# Patient Record
Sex: FEMALE | Race: WHITE | NOT HISPANIC OR LATINO | Employment: FULL TIME
[De-identification: names, ages, dates, MRNs, and addresses within clinical notes are randomized per-mention and may not be internally consistent; named-entity substitution may affect disease eponyms.]

---

## 2017-05-10 ENCOUNTER — RECORDS - HEALTHEAST (OUTPATIENT)
Dept: ADMINISTRATIVE | Facility: OTHER | Age: 42
End: 2017-05-10

## 2017-05-12 ENCOUNTER — HOSPITAL ENCOUNTER (OUTPATIENT)
Dept: ULTRASOUND IMAGING | Facility: HOSPITAL | Age: 42
Discharge: HOME | End: 2017-05-12
Attending: OBSTETRICS & GYNECOLOGY

## 2017-05-12 DIAGNOSIS — E04.1 THYROID NODULE: ICD-10-CM

## 2018-06-05 ENCOUNTER — AMBULATORY - HEALTHEAST (OUTPATIENT)
Dept: SCHEDULING | Facility: CLINIC | Age: 43
End: 2018-06-05

## 2018-06-05 ENCOUNTER — RECORDS - HEALTHEAST (OUTPATIENT)
Dept: ADMINISTRATIVE | Facility: OTHER | Age: 43
End: 2018-06-05

## 2018-06-05 ENCOUNTER — AMBULATORY - HEALTHEAST (OUTPATIENT)
Dept: MULTI SPECIALTY CLINIC | Facility: CLINIC | Age: 43
End: 2018-06-05

## 2018-06-05 DIAGNOSIS — M85.80 LOW BONE MASS: ICD-10-CM

## 2018-06-05 LAB — PAP SMEAR - HIM PATIENT REPORTED: NORMAL

## 2018-06-06 ENCOUNTER — HOSPITAL ENCOUNTER (OUTPATIENT)
Dept: MAMMOGRAPHY | Facility: CLINIC | Age: 43
Discharge: HOME | End: 2018-06-06
Attending: OBSTETRICS & GYNECOLOGY

## 2018-06-06 ENCOUNTER — COMMUNICATION - HEALTHEAST (OUTPATIENT)
Dept: TELEHEALTH | Facility: CLINIC | Age: 43
End: 2018-06-06

## 2018-06-06 DIAGNOSIS — Z12.31 VISIT FOR SCREENING MAMMOGRAM: ICD-10-CM

## 2019-03-14 ENCOUNTER — OFFICE VISIT - HEALTHEAST (OUTPATIENT)
Dept: FAMILY MEDICINE | Facility: CLINIC | Age: 44
End: 2019-03-14

## 2019-03-14 DIAGNOSIS — J01.00 ACUTE MAXILLARY SINUSITIS, RECURRENCE NOT SPECIFIED: ICD-10-CM

## 2019-03-14 DIAGNOSIS — F41.8 DEPRESSION WITH ANXIETY: ICD-10-CM

## 2019-03-14 ASSESSMENT — MIFFLIN-ST. JEOR: SCORE: 1280.24

## 2019-03-20 ENCOUNTER — COMMUNICATION - HEALTHEAST (OUTPATIENT)
Dept: FAMILY MEDICINE | Facility: CLINIC | Age: 44
End: 2019-03-20

## 2019-03-20 DIAGNOSIS — J01.00 ACUTE MAXILLARY SINUSITIS, RECURRENCE NOT SPECIFIED: ICD-10-CM

## 2019-06-20 ENCOUNTER — OFFICE VISIT - HEALTHEAST (OUTPATIENT)
Dept: FAMILY MEDICINE | Facility: CLINIC | Age: 44
End: 2019-06-20

## 2019-06-20 DIAGNOSIS — J01.10 ACUTE NON-RECURRENT FRONTAL SINUSITIS: ICD-10-CM

## 2019-08-13 ENCOUNTER — RECORDS - HEALTHEAST (OUTPATIENT)
Dept: ADMINISTRATIVE | Facility: OTHER | Age: 44
End: 2019-08-13

## 2019-08-21 ENCOUNTER — AMBULATORY - HEALTHEAST (OUTPATIENT)
Dept: SCHEDULING | Facility: CLINIC | Age: 44
End: 2019-08-21

## 2019-08-21 DIAGNOSIS — K50.90 CROHN'S DISEASE (H): ICD-10-CM

## 2019-09-23 ENCOUNTER — OFFICE VISIT - HEALTHEAST (OUTPATIENT)
Dept: FAMILY MEDICINE | Facility: CLINIC | Age: 44
End: 2019-09-23

## 2019-09-23 ENCOUNTER — COMMUNICATION - HEALTHEAST (OUTPATIENT)
Dept: FAMILY MEDICINE | Facility: CLINIC | Age: 44
End: 2019-09-23

## 2019-09-23 DIAGNOSIS — J32.9 CHRONIC SINUSITIS, UNSPECIFIED LOCATION: ICD-10-CM

## 2019-09-23 DIAGNOSIS — D84.9 IMMUNOSUPPRESSION (H): ICD-10-CM

## 2019-09-23 DIAGNOSIS — J06.9 VIRAL URI WITH COUGH: ICD-10-CM

## 2019-11-07 ENCOUNTER — OFFICE VISIT - HEALTHEAST (OUTPATIENT)
Dept: FAMILY MEDICINE | Facility: CLINIC | Age: 44
End: 2019-11-07

## 2019-11-07 DIAGNOSIS — J01.10 ACUTE NON-RECURRENT FRONTAL SINUSITIS: ICD-10-CM

## 2019-11-07 DIAGNOSIS — J32.9 CHRONIC SINUSITIS, UNSPECIFIED LOCATION: ICD-10-CM

## 2019-11-07 RX ORDER — LORAZEPAM 0.5 MG/1
0.5 TABLET ORAL
Qty: 15 TABLET | Refills: 0 | Status: SHIPPED | OUTPATIENT
Start: 2019-11-07

## 2019-11-07 RX ORDER — ALBUTEROL SULFATE 90 UG/1
1-2 AEROSOL, METERED RESPIRATORY (INHALATION) EVERY 4 HOURS PRN
Qty: 8.5 G | Refills: 3 | Status: SHIPPED | OUTPATIENT
Start: 2019-11-07

## 2019-11-07 RX ORDER — CITALOPRAM HYDROBROMIDE 40 MG/1
40 TABLET ORAL DAILY
Qty: 90 TABLET | Refills: 3 | Status: SHIPPED | OUTPATIENT
Start: 2019-11-07

## 2019-11-07 ASSESSMENT — MIFFLIN-ST. JEOR: SCORE: 1272.87

## 2019-12-17 ENCOUNTER — COMMUNICATION - HEALTHEAST (OUTPATIENT)
Dept: FAMILY MEDICINE | Facility: CLINIC | Age: 44
End: 2019-12-17

## 2019-12-17 ENCOUNTER — OFFICE VISIT - HEALTHEAST (OUTPATIENT)
Dept: FAMILY MEDICINE | Facility: CLINIC | Age: 44
End: 2019-12-17

## 2019-12-17 DIAGNOSIS — R05.9 COUGH: ICD-10-CM

## 2019-12-17 DIAGNOSIS — R52 BODY ACHES: ICD-10-CM

## 2019-12-17 LAB
FLUAV AG SPEC QL IA: NORMAL
FLUBV AG SPEC QL IA: NORMAL

## 2021-05-29 NOTE — PROGRESS NOTES
Assessment:   1. Acute non-recurrent frontal sinusitis  Continue Albuterol inhaler to avoid Asthma exacerbation  - albuterol (PROAIR HFA;PROVENTIL HFA;VENTOLIN HFA) 90 mcg/actuation inhaler; Inhale 1-2 puffs every 4 (four) hours as needed for wheezing or shortness of breath.  Dispense: 8.5 g; Refill: 3  - predniSONE (DELTASONE) 20 MG tablet; Take 40 mg by mouth daily.  Dispense: 10 tablet; Refill: 0  - azithromycin (ZITHROMAX) 250 MG tablet; Take 1 tablet (250 mg total) by mouth daily for 5 days. Take 500 mg (2 x 250 mg tablets) on day 1 followed by 250 mg (1 tablet) on days 2-5.  Dispense: 6 tablet; Refill: 0      Plan:   Nasal saline sprays.  Nasal steroids per medication orders.  Zithromax per medication orders.  Follow up in 6 days or as needed.     Subjective:   Selam Tomlinson is a 44 y.o. female who presents for evaluation of sinus pain. Symptoms include: congestion, cough, facial pain, headaches, nasal congestion, post nasal drip and sinus pressure. Onset of symptoms was 4 days ago. Symptoms have been gradually worsening since that time. Past history is significant for asthma and frequent episodes of bronchitis. Patient is a non-smoker.    The following portions of the patient's history were reviewed and updated as appropriate: allergies, current medications, past family history, past medical history, past social history, past surgical history and problem list.    Review of Systems  Pertinent items are noted in HPI.     Objective:   /81   Pulse 77   Temp 98.6  F (37  C)   Wt 135 lb 11.2 oz (61.6 kg)   SpO2 99%   BMI 22.24 kg/m    General appearance: alert, appears stated age and cooperative  Head: Normocephalic, without obvious abnormality, atraumatic, sinuses tender to percussion  Eyes: conjunctivae/corneas clear. PERRL, EOM's intact. Fundi benign.  Ears: normal TM's and external ear canals both ears  Nose: yellow discharge, severe congestion, turbinates swollen, inflamed  Throat: lips, mucosa,  and tongue normal; teeth and gums normal  Neck: no adenopathy, no carotid bruit, no JVD, supple, symmetrical, trachea midline and thyroid not enlarged, symmetric, no tenderness/mass/nodules  Lungs: clear to auscultation bilaterally  Heart: regular rate and rhythm, S1, S2 normal, no murmur, click, rub or gallop  Pulses: 2+ and symmetric  Skin: Skin color, texture, turgor normal. No rashes or lesions  Lymph nodes: Cervical, supraclavicular, and axillary nodes normal.  Neurologic: Grossly normal

## 2021-05-31 ENCOUNTER — RECORDS - HEALTHEAST (OUTPATIENT)
Dept: ADMINISTRATIVE | Facility: CLINIC | Age: 46
End: 2021-05-31

## 2021-06-01 NOTE — TELEPHONE ENCOUNTER
Question following Office Visit  When did you see your provider: Today Walk-in care  What is your question: patient was seen this morning in walk-in care and stated they never looked at her sinuses. Patient feels she has a sinus infection and they never go away on their own. Patient is asking if Lesa Gaviria can call her tomorrow.  Okay to leave a detailed message: Yes [phone]

## 2021-06-02 ENCOUNTER — RECORDS - HEALTHEAST (OUTPATIENT)
Dept: ADMINISTRATIVE | Facility: CLINIC | Age: 46
End: 2021-06-02

## 2021-06-02 VITALS — HEIGHT: 66 IN | WEIGHT: 137 LBS | BODY MASS INDEX: 22.02 KG/M2

## 2021-06-03 VITALS
WEIGHT: 135.38 LBS | SYSTOLIC BLOOD PRESSURE: 110 MMHG | BODY MASS INDEX: 21.76 KG/M2 | HEIGHT: 66 IN | DIASTOLIC BLOOD PRESSURE: 70 MMHG

## 2021-06-03 VITALS
RESPIRATION RATE: 12 BRPM | WEIGHT: 134.2 LBS | BODY MASS INDEX: 21.99 KG/M2 | SYSTOLIC BLOOD PRESSURE: 104 MMHG | HEART RATE: 67 BPM | OXYGEN SATURATION: 98 % | DIASTOLIC BLOOD PRESSURE: 67 MMHG | TEMPERATURE: 98.4 F

## 2021-06-03 VITALS — BODY MASS INDEX: 22.24 KG/M2 | WEIGHT: 135.7 LBS

## 2021-06-03 NOTE — PROGRESS NOTES
HPI:  Selam Tomlinson is a 44 y.o. female who is seen for refills of her citalopram.  She would like to increase her dose to 40 mg daily.  She notes some difficulty sleeping, fatigue, increased anxiety.  She denies suicidal or homicidal ideations.  She has had increased stress due to work.  She continues to receive treatment for Crohn's disease, gets Remicade injections once a month.  She got her last Remicade approximately 2 weeks ago.  She was seen recently in urgent care for congestion, she gets about 4 sinus infections a year.  She is now had about a month of the symptoms and still having sinus pressure and pain.    Review of systems: Negative for fever, diarrhea, bloody stools, vomiting.  Positive for malaise, nausea, headache, lower lid edema bilaterally.  As in HPI.    Chief Complaint   Patient presents with     Medication Check   Selam Tomlinson having worsening sinusitis.    No results found for: HGBA1C  Lab Results   Component Value Date    CREATININE 0.72 02/05/2016     Patient Active Problem List   Diagnosis     Pelvic abscess in female     CD (Crohn's disease) (H)     Anxiety and depression     Cannot sleep     Compulsive tobacco user syndrome     Lower abdominal pain     Abscess, intra-abdominal, postoperative, initial encounter     Intra-abdominal abscess (H)     Acne vulgaris     Allergy to cats     Allergy to dogs     Atypical nevi     Mild intermittent asthma     Tobacco use disorder     Depression with anxiety     Insomnia, unspecified     Crohn's disease (H)     Family History   Problem Relation Age of Onset     Crohn's disease Mother      Crohn's disease Father      Diabetes type II Maternal Grandmother      Crohn's disease Sister      Social History     Socioeconomic History     Marital status:      Spouse name: None     Number of children: None     Years of education: None     Highest education level: None   Occupational History     None   Social Needs     Financial resource strain:  None     Food insecurity:     Worry: None     Inability: None     Transportation needs:     Medical: None     Non-medical: None   Tobacco Use     Smoking status: Former Smoker     Types: Cigarettes     Smokeless tobacco: Never Used   Substance and Sexual Activity     Alcohol use: No     Comment: social     Drug use: No     Sexual activity: None   Lifestyle     Physical activity:     Days per week: None     Minutes per session: None     Stress: None   Relationships     Social connections:     Talks on phone: None     Gets together: None     Attends [employer] service: None     Active member of club or organization: None     Attends meetings of clubs or organizations: None     Relationship status: None     Intimate partner violence:     Fear of current or ex partner: None     Emotionally abused: None     Physically abused: None     Forced sexual activity: None   Other Topics Concern     None   Social History Narrative     None     Past Surgical History:   Procedure Laterality Date     NO PAST SURGERIES       MD PART REMOVAL COLON W ANASTOMOSIS N/A 2016    Procedure: EXPLORATORY LAPAROTOMY, ILEO CECAL RESECTION, ILEO COLOSTOMY;  Surgeon: Alexandre Ledesma MD;  Location: [facility];  Service: General     Current Outpatient Medications on File Prior to Visit   Medication Sig Dispense Refill     INFLIXIMAB (REMICADE IV) Infuse into a venous catheter.       levonorgestrel (MIRENA) 20 mcg/24 hr (5 years) IUD 1 each by Intrauterine route.       No current facility-administered medications on file prior to visit.      No Known Allergies  OB History        2    Para   2    Term   2            AB        Living           SAB        TAB        Ectopic        Multiple        Live Births                   I have reviewed the patient's medical history in detail and updated the computerized patient record.  OBJECTIVE:  Wt Readings from Last 3 Encounters:   19 135 lb 6 oz (61.4 kg)   19 134 lb 3.2  oz (60.9 kg)   06/20/19 135 lb 11.2 oz (61.6 kg)     Temp Readings from Last 3 Encounters:   09/23/19 98.4  F (36.9  C) (Oral)   06/20/19 98.6  F (37  C)   02/06/16 98.6  F (37  C) (Oral)     BP Readings from Last 3 Encounters:   11/07/19 110/70   09/23/19 104/67   06/20/19 127/81     Pulse Readings from Last 3 Encounters:   09/23/19 67   06/20/19 77   03/14/19 81     Body mass index is 22.18 kg/m .     Alert, cooperative, well-hydrated. Appears well.  Eyes: Pupils equal, round, reactive to light.  HEENT: Sclera white, nares injected and erythematous, edematous bilaterally, MMM, TM's pearly bilaterally  Lungs: Clear to auscultation. No retractions, no increased work of respiration, equal chest rise.   Heart: Regular rate and rhythm, no murmurs, clicks,   Gallops.  Extremities: no tenderness to palpation of gastrocnemius, bilaterally.  Skin: no increased warmth, edema, or erythema of lower legs bilaterally.    Labs:  No visits with results within 3 Month(s) from this visit.   Latest known visit with results is:   Admission on 02/02/2016, Discharged on 02/06/2016   Component Date Value     WBC 02/02/2016 13.1*     RBC 02/02/2016 3.76*     Hemoglobin 02/02/2016 10.4*     Hematocrit 02/02/2016 32.9*     MCV 02/02/2016 87      MCH 02/02/2016 27.7      MCHC 02/02/2016 31.7*     RDW 02/02/2016 16.6*     Platelets 02/02/2016 433      MPV 02/02/2016 7.4      Sodium 02/02/2016 138      Potassium 02/02/2016 3.9      Chloride 02/02/2016 104      CO2 02/02/2016 26      Anion Gap, Calculation 02/02/2016 8      Glucose 02/02/2016 96      Calcium 02/02/2016 9.0      BUN 02/02/2016 3*     Creatinine 02/02/2016 0.66      GFR MDRD Af Amer 02/02/2016 >60      GFR MDRD Non Af Amer 02/02/2016 >60      Color, UA 02/02/2016 Yellow      Clarity, UA 02/02/2016 Cloudy*     Glucose, UA 02/02/2016 Negative      Bilirubin, UA 02/02/2016 Negative      Ketones, UA 02/02/2016 Trace*     Specific Gravity, UA 02/02/2016 1.021      Blood, UA  02/02/2016 Trace*     pH, UA 02/02/2016 5.5      Protein, UA 02/02/2016 30 mg/dL*     Urobilinogen, UA 02/02/2016 2.0 E.U./dL      Nitrite, UA 02/02/2016 Negative      Leukocytes, UA 02/02/2016 Small*     Bacteria, UA 02/02/2016 Few*     RBC, UA 02/02/2016 0-2      WBC, UA 02/02/2016 0-5      Squam Epithel, UA 02/02/2016 25-50*     Mucus, UA 02/02/2016 Many*     POC Preg, Urine 02/02/2016 Negative      POC Specific Gravity, Ur* 02/02/2016 1.010      Culture 02/02/2016 No Growth      Aerobic Blood Culture Singh* 02/02/2016 No Growth      Anaerobic Blood Culture * 02/02/2016 No Growth      Aerobic Blood Culture Singh* 02/02/2016 No Growth      Anaerobic Blood Culture * 02/02/2016 No Growth      Sodium 02/03/2016 140      Potassium 02/03/2016 4.1      Chloride 02/03/2016 104      CO2 02/03/2016 29      Anion Gap, Calculation 02/03/2016 7      Glucose 02/03/2016 104      BUN 02/03/2016 4*     Creatinine 02/03/2016 0.72      GFR MDRD Af Amer 02/03/2016 >60      GFR MDRD Non Af Amer 02/03/2016 >60      Bilirubin, Total 02/03/2016 0.4      Calcium 02/03/2016 8.8      Protein, Total 02/03/2016 6.0      Albumin 02/03/2016 2.6*     Alkaline Phosphatase 02/03/2016 62      AST 02/03/2016 12      ALT 02/03/2016 14      Prealbumin 02/03/2016 14.7*     WBC 02/03/2016 12.4*     RBC 02/03/2016 3.59*     Hemoglobin 02/03/2016 10.0*     Hematocrit 02/03/2016 31.6*     MCV 02/03/2016 88      MCH 02/03/2016 27.9      MCHC 02/03/2016 31.6*     RDW 02/03/2016 17.0*     Platelets 02/03/2016 395      MPV 02/03/2016 7.4      Neutrophils % 02/03/2016 77*     Lymphocytes % 02/03/2016 15*     Monocytes % 02/03/2016 7      Eosinophils % 02/03/2016 1      Basophils % 02/03/2016 0      Neutrophils Absolute 02/03/2016 9.5*     Lymphocytes Absolute 02/03/2016 1.8      Monocytes Absolute 02/03/2016 0.9      Eosinophils Absolute 02/03/2016 0.1      Basophils Absolute 02/03/2016 0.0      Platelets 02/04/2016 378      WBC 02/05/2016 9.1      RBC 02/05/2016  3.80      Hemoglobin 02/05/2016 10.6*     Hematocrit 02/05/2016 33.5*     MCV 02/05/2016 88      MCH 02/05/2016 27.8      MCHC 02/05/2016 31.5*     RDW 02/05/2016 16.6*     Platelets 02/05/2016 406      MPV 02/05/2016 7.6      Sodium 02/05/2016 144      Potassium 02/05/2016 3.8      Chloride 02/05/2016 107      CO2 02/05/2016 28      Anion Gap, Calculation 02/05/2016 9      Glucose 02/05/2016 82      Calcium 02/05/2016 8.8      BUN 02/05/2016 5*     Creatinine 02/05/2016 0.72      GFR MDRD Af Amer 02/05/2016 >60      GFR MDRD Non Af Amer 02/05/2016 >60      Platelets 02/06/2016 381      ASSESMENT/PLAN:  1. Acute non-recurrent frontal sinusitis  LORazepam (ATIVAN) 0.5 MG tablet    citalopram (CELEXA) 40 MG tablet    albuterol (PROAIR HFA;PROVENTIL HFA;VENTOLIN HFA) 90 mcg/actuation inhaler    DISCONTINUED: albuterol (PROAIR HFA;PROVENTIL HFA;VENTOLIN HFA) 90 mcg/actuation inhaler    DISCONTINUED: citalopram (CELEXA) 20 MG tablet   2. Chronic sinusitis, unspecified location  azithromycin (ZITHROMAX) 250 MG tablet   Discussed changing antibiotics and since she has risk of diarrhea due to her Crohn's disease and does all right with a azithromycin we will try this again.  Explained that since she is 2 weeks away from her Remicade she may have good resolution with this medication.  Continue to use antihistamine, Flonase, nasal saline for chronic sinusitis.  Will increase citalopram to 40 mg daily and she may use her MyChart to discuss whether this dose is working well for her.  Follow-up in 6 weeks if needing further adjustments in medication.    Lesa Gaviria, MS, PA-C 11/10/19

## 2021-06-04 ENCOUNTER — RECORDS - HEALTHEAST (OUTPATIENT)
Dept: ADMINISTRATIVE | Facility: OTHER | Age: 46
End: 2021-06-04

## 2021-06-04 VITALS
WEIGHT: 138.4 LBS | SYSTOLIC BLOOD PRESSURE: 121 MMHG | TEMPERATURE: 98.3 F | BODY MASS INDEX: 22.68 KG/M2 | HEART RATE: 82 BPM | OXYGEN SATURATION: 98 % | DIASTOLIC BLOOD PRESSURE: 77 MMHG

## 2021-06-04 NOTE — PROGRESS NOTES
ASSESSMENT:  1. Cough  Chest x-ray negative, I presume cough secondary to a viral process.  - Influenza A/B Rapid Test  - XR Chest 2 Views    2. Body aches  Influenza a and B both negative, presumed same viral process as for the cough.  - Influenza A/B Rapid Test  - XR Chest 2 Views        PLAN:  1.  Two-view chest x-ray results discussed with the patient.  2.  He also reviewed the influenza swab results.  3.  For now continued symptomatic treatment.  4.  Of note the patient is scheduled for Remicade infusion tomorrow for to her specialist if that should proceed or not.    Orders Placed This Encounter   Procedures     Influenza A/B Rapid Test     XR Chest 2 Views     Order Specific Question:   Reason for Exam (Describe Symptoms):     Answer:   cough, non-productive     Order Specific Question:   Can the procedure be changed per Radiologist protocol?     Answer:   Yes     Order Specific Question:   Is the patient pregnant?     Answer:   No     Medications Discontinued During This Encounter   Medication Reason     azithromycin (ZITHROMAX) 250 MG tablet Therapy completed       Return in about 1 week (around 12/24/2019) for recheck w/ PCP  if symptoms persist or worsen.    CHIEF COMPLAINT:  Chief Complaint   Patient presents with     URI     for about 2-3 days.      Cough     Daughter tested positive for influenza A and pneumonia recently       SUBJECTIVE:  Selam is a 44 y.o. female presenting to the clinic today with a dry cough for the past 2-3 days. She is also experiencing headaches, intermittent chills, body aches, and she has an ear ache. Patient has a history of asthma and uses her inhaler occasionally. Her daughter was diagnosed with influenza A and pneumonia last night.     REVIEW OF SYSTEMS:   +Chest tightness  Denies fever  All other systems are negative.    PFSH:  Immunization History   Administered Date(s) Administered     Hep A, Adult IM (19yr & older) 01/13/2016, 04/12/2017     Influenza, inj,  historic,unspecified 10/28/2015     Influenza,seasonal quad, PF, =/> 6months 10/28/2015, 10/27/2016, 01/10/2018     Influenza,seasonal, Inj IIV3 10/05/2009, 10/01/2010, 10/27/2011, 11/21/2012     Pneumo Conj 13-V (2010&after) 10/27/2016     Pneumo Polysac 23-V 10/28/2015, 04/10/2018     Pneumococcal Vaccine, Unspecified 10/28/2015     Tdap 10/05/2009     Social History     Socioeconomic History     Marital status:      Spouse name: Not on file     Number of children: Not on file     Years of education: Not on file     Highest education level: Not on file   Occupational History     Not on file   Social Needs     Financial resource strain: Not on file     Food insecurity:     Worry: Not on file     Inability: Not on file     Transportation needs:     Medical: Not on file     Non-medical: Not on file   Tobacco Use     Smoking status: Former Smoker     Types: Cigarettes     Smokeless tobacco: Never Used   Substance and Sexual Activity     Alcohol use: No     Comment: social     Drug use: No     Sexual activity: Not on file   Lifestyle     Physical activity:     Days per week: Not on file     Minutes per session: Not on file     Stress: Not on file   Relationships     Social connections:     Talks on phone: Not on file     Gets together: Not on file     Attends [employer] service: Not on file     Active member of club or organization: Not on file     Attends meetings of clubs or organizations: Not on file     Relationship status: Not on file     Intimate partner violence:     Fear of current or ex partner: Not on file     Emotionally abused: Not on file     Physically abused: Not on file     Forced sexual activity: Not on file   Other Topics Concern     Not on file   Social History Narrative     Not on file     Past Medical History:   Diagnosis Date     Anxiety      Asthma      Crohn's disease (H)      Depression      Insomnia      Family History   Problem Relation Age of Onset     Crohn's disease Mother       Crohn's disease Father      Diabetes type II Maternal Grandmother      Crohn's disease Sister        MEDICATIONS:  Current Outpatient Medications   Medication Sig Dispense Refill     albuterol (PROAIR HFA;PROVENTIL HFA;VENTOLIN HFA) 90 mcg/actuation inhaler Inhale 1-2 puffs every 4 (four) hours as needed for wheezing or shortness of breath. 8.5 g 3     citalopram (CELEXA) 40 MG tablet Take 1 tablet (40 mg total) by mouth daily. 90 tablet 3     INFLIXIMAB (REMICADE IV) Infuse into a venous catheter.       levonorgestrel (MIRENA) 20 mcg/24 hr (5 years) IUD 1 each by Intrauterine route.       LORazepam (ATIVAN) 0.5 MG tablet Take 1 tablet (0.5 mg total) by mouth once as needed for anxiety. 15 tablet 0     No current facility-administered medications for this visit.        TOBACCO USE:  Social History     Tobacco Use   Smoking Status Former Smoker     Types: Cigarettes   Smokeless Tobacco Never Used       VITALS:  Vitals:    12/17/19 0910   BP: 121/77   Pulse: 82   Temp: 98.3  F (36.8  C)   SpO2: 98%   Weight: 138 lb 6.4 oz (62.8 kg)     Wt Readings from Last 3 Encounters:   12/17/19 138 lb 6.4 oz (62.8 kg)   11/07/19 135 lb 6 oz (61.4 kg)   09/23/19 134 lb 3.2 oz (60.9 kg)       PHYSICAL EXAM:  Constitutional:   Reveals a mildly ill appearing woman.    Vitals: per nursing notes.  Head: Atraumatic, Normocephalic  Nose: No significant mucous noted.  Ears:  External canals, TMs clear.    Eyes:  EOMs full, PERRL.  Lungs: Clear to A&P without rales or wheezes.  Respiratory effort normal.  Cardiac:   Regular rate and rhythm, normal S1, S2, no murmur or gallop.  Neuro:  Alert and oriented. Cranial nerves, motor, sensory exams are intact.  No gross focal deficits.  Psychiatric:  Memory intact, mood appropriate.    DATA REVIEWED:  Additional History from Old Records Summarized (2): None.  Decision to Obtain Records (1): None.  Radiology Tests Summarized or Ordered (1): Chest Xray ordered.  Labs Reviewed or Ordered (1): Labs  ordered  Medicine Test Summarized or Ordered (1): None.  Independent Review of EKG, X-RAY, or RAPID STREP (2 each): Preliminary impression is negative for pneumonia.     The visit lasted a total of 8 minutes face to face with the patient. Over 50% of the time was spent counseling and educating the patient about coughs.    I, Patricio Mcarthur, am scribing for and in the presence of, Dr. Back.    I, Dr. Back, personally performed the services described in this documentation, as scribed by Patricio Mcarthur in my presence, and it is both accurate and complete.    Total data points: 4

## 2021-06-08 ENCOUNTER — HOSPITAL ENCOUNTER (OUTPATIENT)
Dept: CT IMAGING | Facility: HOSPITAL | Age: 46
Discharge: HOME | End: 2021-06-08

## 2021-06-08 DIAGNOSIS — K50.00 CROHN'S DISEASE OF SMALL INTESTINE WITHOUT COMPLICATION (H): ICD-10-CM

## 2021-06-16 PROBLEM — J30.81 ALLERGY TO CATS: Status: ACTIVE | Noted: 2019-03-15

## 2021-06-16 PROBLEM — J30.81 ALLERGY TO DOGS: Status: ACTIVE | Noted: 2019-03-15

## 2021-06-16 PROBLEM — J45.20 MILD INTERMITTENT ASTHMA: Status: ACTIVE | Noted: 2019-03-15

## 2021-06-16 PROBLEM — L70.0 ACNE VULGARIS: Status: ACTIVE | Noted: 2017-06-06

## 2021-06-17 NOTE — PATIENT INSTRUCTIONS - HE
Patient Instructions by Allison Haas FNP at 6/20/2019 10:30 AM     Author: Allison Haas FNP Service: -- Author Type: Nurse Practitioner    Filed: 6/20/2019 10:49 AM Encounter Date: 6/20/2019 Status: Signed    : Allison Haas FNP (Nurse Practitioner)       Patient Education     Acute Bacterial Rhinosinusitis (ABRS)    Acute bacterial rhinosinusitis (ABRS) is an infection of your nasal cavity and sinuses. Its caused by bacteria. Acute means that youve had symptoms for less than 4 weeks, but possibly up to 12 weeks.  Understanding your sinuses  The nasal cavity is the large air-filled space behind your nose. The sinuses are a group of spaces formed by the bones of your face. They connect with your nasal cavity. ABRS causes the tissue lining these spaces to become inflamed. Mucus may not drain normally. This leads to facial pain and other symptoms.  What causes ABRS?  ABRS most often follows an upper respiratory infection caused by a virus. Bacteria then infect the lining of your nasal cavity and sinuses. But you can also get ABRS if you have:    Nasal allergies    Long-term nasal swelling and congestion not caused by allergies    Blockage in the nose  Symptoms of ABRS  The symptoms of ABRS may be different for each person and include:    Nasal congestion or blockage    Pain or pressure in the face    Thick, colored drainage from the nose  Other symptoms may include:    Runny nose    Fluid draining from the nose down the throat (postnasal drip)    Headache    Cough    Pain    Fever  Diagnosing ABRS  ABRS may be diagnosed if youve had an upper respiratory infection like a cold and cough for 10 or more days without improvement or with worsening symptoms. Your healthcare provider will ask about your symptoms and your medical history. The provider will check your vital signs, including your temperature. Youll have a physical exam. The healthcare provider will check your ears, nose, and throat. You  likely wont need any tests. If ABRS comes back, you may have a culture or other tests.  Treatment for ABRS  Treatment may include:    Antibiotic medicine. This is for symptoms that last for at least 10 to 14 days.    Nasal corticosteroid medicine. Drops or spray used in the nose can lessen swelling and congestion.    Over-the-counter pain medicine. This is to lessen sinus pain and pressure.    Nasal decongestant medicine. Spray or drops may help to lessen congestion. Do not use them for more than a few days.    Salt wash (saline irrigation). This can help to loosen mucus.  Possible complications of ABRS  ABRS may come back or become long-term (chronic). In rare cases, ABRS may cause complications such as:     Inflamed tissue around the brain and spinal cord (meningitis)    Inflamed tissue around the eyes (orbital cellulitis)    Inflamed bones around the sinuses (osteitis)  These problems may need to be treated in a hospital with intravenous (IV) antibiotic medicine or surgery.  When to call the healthcare provider  Call your healthcare provider if you have any of the following:    Symptoms that dont get better, or get worse    Symptoms that dont get better after 3 to 5 days on antibiotics    Trouble seeing    Swelling around your eyes    Confusion or trouble staying awake   Date Last Reviewed: 5/1/2017 2000-2017 [employer]. [address] [place] [address], [place] [address]. All rights reserved. This information is not intended as a substitute for professional medical care. Always follow your healthcare professional's instructions.

## 2021-06-17 NOTE — PATIENT INSTRUCTIONS - HE
Patient Instructions by Fide Mas CNP at 9/23/2019  7:00 AM     Author: Fide Mas CNP Service: -- Author Type: Nurse Practitioner    Filed: 9/23/2019  7:30 AM Encounter Date: 9/23/2019 Status: Addendum    : Fide Mas CNP (Nurse Practitioner)    Related Notes: Original Note by iFde Mas CNP (Nurse Practitioner) filed at 9/23/2019  7:28 AM       Recommend Neti Pot/Sinus Rinse and/or Afrin (only use 3 days) for congestion/sinus pressure.    Productive cough = Mucinex will help get mucus out.    Dry cough = Dayquil or Nyquil.      Tea with 1 tsp of honey for cough.      Tylenol (acetaminophen) or ibuprofen as needed for discomfort if not included in cough medicine.       Patient Education     Viral Upper Respiratory Illness (Adult)  You have a viral upper respiratory illness (URI), which is another term for the common cold. This illness is contagious during the first few days. It is spread through the air by coughing and sneezing. It may also be spread by direct contact (touching the sick person and then touching your own eyes, nose, or mouth). Frequent handwashing will decrease risk of spread. Most viral illnesses go away within 7 to 10 days with rest and simple home remedies. Sometimes the illness may last for several weeks. Antibiotics will not kill a virus, and they are generally not prescribed for this condition.    Home care    If symptoms are severe, rest at home for the first 2 to 3 days. When you resume activity, don't let yourself get too tired.    Avoid being exposed to cigarette smoke (yours or others).    You may use acetaminophen or ibuprofen to control pain and fever, unless another medicine was prescribed. If you have chronic liver or kidney disease, have ever had a stomach ulcer or gastrointestinal bleeding, or are taking blood-thinning medicines, talk with your healthcare provider before using these medicines. Aspirin should never be given to anyone under 18 years  of age who is ill with a viral infection or fever. It may cause severe liver or brain damage.    Your appetite may be poor, so a light diet is fine. Avoid dehydration by drinking 6 to 8 glasses of fluids per day (water, soft drinks, juices, tea, or soup). Extra fluids will help loosen secretions in the nose and lungs.    Over-the-counter cold medicines will not shorten the length of time youre sick, but they may be helpful for the following symptoms: cough, sore throat, and nasal and sinus congestion. (Note: Do not use decongestants if you have high blood pressure.)  Follow-up care  Follow up with your healthcare provider, or as advised.  When to seek medical advice  Call your healthcare provider right away if any of these occur:    Cough with lots of colored sputum (mucus)    Severe headache; face, neck, or ear pain    Difficulty swallowing due to throat pain    Fever of 100.4 F (38 C) or higher, or as directed by your healthcare provider  Call 911  Call 911 if any of these occur:    Chest pain, shortness of breath, wheezing, or difficulty breathing    Coughing up blood    Inability to swallow due to throat pain  Date Last Reviewed: 9/13/2015 2000-2017 [employer]. [address] [place] [address], [place] [address]. All rights reserved. This information is not intended as a substitute for professional medical care. Always follow your healthcare professional's instructions.

## 2021-06-20 NOTE — LETTER
Letter by Octavio Back MD at      Author: Octavio Back MD Service: -- Author Type: --    Filed:  Encounter Date: 12/17/2019 Status: Signed         Selam Tomlinson  [address] [place] [address]             December 17, 2019         Dear Ms. Tomlinson,    Below are the results from your recent visit: Testing for both influenza A and influenza B both negative.    Resulted Orders   Influenza A/B Rapid Test   Result Value Ref Range    Influenza  A, Rapid Antigen No Influenza A antigen detected No Influenza A antigen detected    Influenza B, Rapid Antigen No Influenza B antigen detected No Influenza B antigen detected    Narrative    A negative result does not exclude an influenza infection.  FluMist ? will cause false positive results.            Please call with questions or contact us using [employer].    Sincerely,        Electronically signed by Octavio Back MD

## 2021-06-20 NOTE — LETTER
Letter by Octavio Back MD at      Author: Octavio Back MD Service: -- Author Type: --    Filed:  Encounter Date: 12/17/2019 Status: Signed         Selam Tomlinson  [address] [place] [address]             December 17, 2019         Dear Ms. Tomlinson,    Below are the results from your recent visit: The official radiology interpretation is negative for pneumonia or any other finding.    Resulted Orders   XR Chest 2 Views    Narrative    EXAM: XR CHEST 2 VIEWS  LOCATION: [facility]  DATE/TIME: 12/17/2019 9:33 AM    INDICATION: cough, non-productive  COMPARISON: None.      Impression    Normal cardiac and mediastinal contours. The lungs are symmetrically inflated and are clear. No pleural effusion or pneumothorax.     Upper abdomen is unremarkable.     CONCLUSION:   Normal chest.             Please call with questions or contact us using [employer].    Sincerely,        Electronically signed by Octavio Back MD

## 2021-06-25 NOTE — PROGRESS NOTES
HPI:  Selam Tomlinson is a 43 y.o. female who is seen for   Chief Complaint   Patient presents with     Establish Care     Medication Refill   Selam Tomlinson seen for follow-up and refills in to establish as a new patient in my practice.  She was seen by Abigail Clinton in the past and she retired.  She has been on Celexa 20 mg daily and this is keeping her anxiety symptoms under good control.  She states that in the last several months she has used lorazepam's.  One prescription usually lasts her several months, she uses this rarely approximately once a month.  She currently has some increased stress from her job and is now completely out of this medication, she wanted to take one last night after a very stressful day.  She has a history of Crohn's disease, has had GI surgery.  She is also on Remicade, gets every 8-week IV treatment.  This has kept her Crohn's under good control, she has maintained her weight, is not having black or bloody stools.  Has occasional abdominal pain but this is at its normal baseline for her.  She notes that she has had several weeks of worsening sinus congestion, now has sinus headache maxillary pain.  About once a year she gets a sinus infection.  She notes some ear pain as well, no fever, cough, shortness of breath, wheezing.  She does have a history of asthma and needs a refill of her inhaler.  ROS: Patient denies fever, chills, sweats, fainting, fatigue, weight change, dizziness, sleep problems, chest pain, palpitations, shortness of breath, wheezing, cough,  sore throat, changes in hearing,tinnitus,  disphagia, sore throat, globus, changes in vision, eye pain eye redness, acid reflux, nausea, vomiting, diarrhea, constipation, black or bloody stools,  Dysuria, frequency, urinary incontinence, nocturia, hematuria, back pain,joint pain, bone pain, muscle cramps,edema, weakness, numbness, tingling of extremities, rash, itching, skin changes, swollen lymph nodes, thirst, increased  urination, breast lumps, breast pain, nipple discharge, memory difficulties, anxiety, mood swings, (female)vaginal discharge, dyspareunia, menorrhagia, pelvic pain, sexual dysfunction,       No results found for: HGBA1C  Lab Results   Component Value Date    CREATININE 0.72 02/05/2016     Patient Active Problem List   Diagnosis     Pelvic abscess in female     CD (Crohn's disease) (H)     Anxiety and depression     Cannot sleep     Compulsive tobacco user syndrome     Lower abdominal pain     Abscess, intra-abdominal, postoperative, initial encounter     Intra-abdominal abscess (H)     Family History   Problem Relation Age of Onset     Crohn's disease Mother      Crohn's disease Father      Diabetes type II Maternal Grandmother      Crohn's disease Sister      Social History     Socioeconomic History     Marital status:      Spouse name: None     Number of children: None     Years of education: None     Highest education level: None   Occupational History     None   Social Needs     Financial resource strain: None     Food insecurity:     Worry: None     Inability: None     Transportation needs:     Medical: None     Non-medical: None   Tobacco Use     Smoking status: Former Smoker     Types: Cigarettes     Smokeless tobacco: Never Used   Substance and Sexual Activity     Alcohol use: No     Comment: social     Drug use: No     Sexual activity: None   Lifestyle     Physical activity:     Days per week: None     Minutes per session: None     Stress: None   Relationships     Social connections:     Talks on phone: None     Gets together: None     Attends Muslim service: None     Active member of club or organization: None     Attends meetings of clubs or organizations: None     Relationship status: None     Intimate partner violence:     Fear of current or ex partner: None     Emotionally abused: None     Physically abused: None     Forced sexual activity: None   Other Topics Concern     None   Social  History Narrative     None     Past Surgical History:   Procedure Laterality Date     NO PAST SURGERIES       NE PART REMOVAL COLON W ANASTOMOSIS N/A 2016    Procedure: EXPLORATORY LAPAROTOMY, ILEO CECAL RESECTION, ILEO COLOSTOMY;  Surgeon: Alexandre Ledesma MD;  Location: [facility];  Service: General     Current Outpatient Medications on File Prior to Visit   Medication Sig Dispense Refill     albuterol (PROVENTIL HFA;VENTOLIN HFA) 90 mcg/actuation inhaler Inhale 1-2 puffs every 4 (four) hours as needed for wheezing or shortness of breath.        INFLIXIMAB (REMICADE IV) Infuse into a venous catheter.       levonorgestrel (MIRENA) 20 mcg/24 hr (5 years) IUD 1 each by Intrauterine route.       [DISCONTINUED] acetaminophen (TYLENOL) 500 MG tablet Take 1,000 mg by mouth every 6 (six) hours as needed for pain (max 4,000 mg of acetaminophen per 24 hours from all sources).        [DISCONTINUED] oxyCODONE-acetaminophen (PERCOCET) 5-325 mg per tablet Take 1-2 tablets by mouth every 4 (four) hours as needed for pain.       No current facility-administered medications on file prior to visit.      No Known Allergies  OB History      Para Term  AB Living    2 2 2          SAB TAB Ectopic Multiple Live Births                     I have reviewed the patient's medical history in detail and updated the computerized patient record.  OBJECTIVE:  Wt Readings from Last 3 Encounters:   19 137 lb (62.1 kg)   16 107 lb 14.4 oz (48.9 kg)   16 117 lb 8 oz (53.3 kg)     Temp Readings from Last 3 Encounters:   16 98.6  F (37  C) (Oral)   16 98.7  F (37.1  C) (Oral)   16 98.7  F (37.1  C) (Oral)     BP Readings from Last 3 Encounters:   19 122/64   16 100/62   16 115/50     Pulse Readings from Last 3 Encounters:   19 81   16 69   16 (!) 59     Body mass index is 22.45 kg/m .     Alert, cooperative, well-hydrated. Appears well.  Eyes: Pupils  equal, round, reactive to light.  HEENT: Sclera white, nares erythematous, edematous, exudates bilaterally, maxillary tenderness bilaterally, MMM, TM's pearly bilaterally.   Neck: supple, without lymphadenopathy, Thyroid freely movable and without hypotrophy or nodularity.   Lungs: Clear to auscultation. No retractions, no increased work of respiration, equal chest rise.   Heart: Regular rate and rhythm, no murmurs, clicks,   Gallops.  Abdomen: Soft, bowel sounds in 4 quadrants with no tenderness to palpation, no organomegaly or masses, no aortic or renal bruits.  Extremities: no tenderness to palpation of gastrocnemius, bilaterally.  Skin: no increased warmth, edema, or erythema of lower legs bilaterally.  Back: No cervical, thoracic or lumbar tenderness to spinous processes or musculature.  Mood: Affect flat, no pressured speech, no psychomotor agitation, no suicidal or homicidal ideations.  Little interest or pleasure in doing things: Not at all  Feeling down, depressed, or hopeless: Not at all  Trouble falling or staying asleep, or sleeping too much: Not at all  Feeling tired or having little energy: Several days  Poor appetite or overeating: Not at all  Feeling bad about yourself - or that you are a failure or have let yourself or your family down: Not at all  Trouble concentrating on things, such as reading the newspaper or watching television: More than half the days  Moving or speaking so slowly that other people could have noticed. Or the opposite - being so fidgety or restless that you have been moving around a lot more than usual: Not at all  Thoughts that you would be better off dead, or of hurting yourself in some way: Not at all  PHQ-9 Total Score: 3  If you checked off any problems, how difficult have these problems made it for you to do your work, take care of things at home, or get along with other people?: Somewhat difficult    Labs:  No visits with results within 3 Month(s) from this visit.    Latest known visit with results is:   Admission on 02/02/2016, Discharged on 02/06/2016   Component Date Value     WBC 02/02/2016 13.1*     RBC 02/02/2016 3.76*     Hemoglobin 02/02/2016 10.4*     Hematocrit 02/02/2016 32.9*     MCV 02/02/2016 87      MCH 02/02/2016 27.7      MCHC 02/02/2016 31.7*     RDW 02/02/2016 16.6*     Platelets 02/02/2016 433      MPV 02/02/2016 7.4      Sodium 02/02/2016 138      Potassium 02/02/2016 3.9      Chloride 02/02/2016 104      CO2 02/02/2016 26      Anion Gap, Calculation 02/02/2016 8      Glucose 02/02/2016 96      Calcium 02/02/2016 9.0      BUN 02/02/2016 3*     Creatinine 02/02/2016 0.66      GFR MDRD Af Amer 02/02/2016 >60      GFR MDRD Non Af Amer 02/02/2016 >60      Color, UA 02/02/2016 Yellow      Clarity, UA 02/02/2016 Cloudy*     Glucose, UA 02/02/2016 Negative      Bilirubin, UA 02/02/2016 Negative      Ketones, UA 02/02/2016 Trace*     Specific Gravity, UA 02/02/2016 1.021      Blood, UA 02/02/2016 Trace*     pH, UA 02/02/2016 5.5      Protein, UA 02/02/2016 30 mg/dL*     Urobilinogen, UA 02/02/2016 2.0 E.U./dL      Nitrite, UA 02/02/2016 Negative      Leukocytes, UA 02/02/2016 Small*     Bacteria, UA 02/02/2016 Few*     RBC, UA 02/02/2016 0-2      WBC, UA 02/02/2016 0-5      Squam Epithel, UA 02/02/2016 25-50*     Mucus, UA 02/02/2016 Many*     POC Preg, Urine 02/02/2016 Negative      POC Specific Gravity, Ur* 02/02/2016 1.010      Culture 02/02/2016 No Growth      Aerobic Blood Culture Singh* 02/02/2016 No Growth      Anaerobic Blood Culture * 02/02/2016 No Growth      Aerobic Blood Culture Singh* 02/02/2016 No Growth      Anaerobic Blood Culture * 02/02/2016 No Growth      Sodium 02/03/2016 140      Potassium 02/03/2016 4.1      Chloride 02/03/2016 104      CO2 02/03/2016 29      Anion Gap, Calculation 02/03/2016 7      Glucose 02/03/2016 104      BUN 02/03/2016 4*     Creatinine 02/03/2016 0.72      GFR MDRD Af Amer 02/03/2016 >60      GFR MDRD Non Af Amer 02/03/2016  >60      Bilirubin, Total 02/03/2016 0.4      Calcium 02/03/2016 8.8      Protein, Total 02/03/2016 6.0      Albumin 02/03/2016 2.6*     Alkaline Phosphatase 02/03/2016 62      AST 02/03/2016 12      ALT 02/03/2016 14      Prealbumin 02/03/2016 14.7*     WBC 02/03/2016 12.4*     RBC 02/03/2016 3.59*     Hemoglobin 02/03/2016 10.0*     Hematocrit 02/03/2016 31.6*     MCV 02/03/2016 88      MCH 02/03/2016 27.9      MCHC 02/03/2016 31.6*     RDW 02/03/2016 17.0*     Platelets 02/03/2016 395      MPV 02/03/2016 7.4      Neutrophils % 02/03/2016 77*     Lymphocytes % 02/03/2016 15*     Monocytes % 02/03/2016 7      Eosinophils % 02/03/2016 1      Basophils % 02/03/2016 0      Neutrophils Absolute 02/03/2016 9.5*     Lymphocytes Absolute 02/03/2016 1.8      Monocytes Absolute 02/03/2016 0.9      Eosinophils Absolute 02/03/2016 0.1      Basophils Absolute 02/03/2016 0.0      Platelets 02/04/2016 378      WBC 02/05/2016 9.1      RBC 02/05/2016 3.80      Hemoglobin 02/05/2016 10.6*     Hematocrit 02/05/2016 33.5*     MCV 02/05/2016 88      MCH 02/05/2016 27.8      MCHC 02/05/2016 31.5*     RDW 02/05/2016 16.6*     Platelets 02/05/2016 406      MPV 02/05/2016 7.6      Sodium 02/05/2016 144      Potassium 02/05/2016 3.8      Chloride 02/05/2016 107      CO2 02/05/2016 28      Anion Gap, Calculation 02/05/2016 9      Glucose 02/05/2016 82      Calcium 02/05/2016 8.8      BUN 02/05/2016 5*     Creatinine 02/05/2016 0.72      GFR MDRD Af Amer 02/05/2016 >60      GFR MDRD Non Af Amer 02/05/2016 >60      Platelets 02/06/2016 381      ASSESMENT/PLAN:  1. Acute maxillary sinusitis, recurrence not specified  azithromycin (ZITHROMAX) 250 MG tablet   2. Depression with anxiety  PHQ9 Depression Screen     Discussed her minimal use of lorazepam and required every 3-month visits for regular use.  We will give her a count of 15 she is to follow-up in 6 months for refill and recheck.  If this amount does not last her 6 months we will need to  do a contract for scheduled medication and every 3-month follow-up.  She voices understanding.  We will keep her Celexa at current dose since she states it is working well for her and PHQ 9 indicates that her symptoms are well controlled.  Her next Remicade is not until the end of the month in April.  She should have resolution of the sinus infection by the long before the next dose.  Follow-up if symptoms do not improve.    Lesa Gaviria, MS, PA-C 03/15/19

## 2021-06-28 NOTE — PROGRESS NOTES
Progress Notes by Fide Mas CNP at 9/23/2019  7:00 AM     Author: Fide Mas CNP Service: -- Author Type: Nurse Practitioner    Filed: 9/23/2019  7:35 AM Encounter Date: 9/23/2019 Status: Signed    : Fide Mas CNP (Nurse Practitioner)       Chief Complaint   Patient presents with   ? Sinus Problem     head congestion, barky coughing, bilateral ear pains, headaches x 2 days        ASSESSMENT & PLAN:   Diagnoses and all orders for this visit:    Viral URI with cough    Immunosuppression (H)        MDM:  Vital signs, history, and presentation with normal lung sounds consistent with viral URI.    This patient is on Remicade and is immunosuppressed, to call primary care provider if congestion expands to face/head, fevers, or facial swelling occurs for    Supportive care discussed.  See discharge instructions below for specific recommendations given.    At the end of the encounter, I discussed results, diagnosis, medications. Discussed red flags for immediate return to clinic/ER, as well as indications for follow up if no improvement. Patient and/or caregiver understood and agreed to plan. Patient was stable for discharge.    SUBJECTIVE    HPI:   HPI  Selam Tomlinson presents to the walk-in clinic with cough, nasal congestion, bilateral ear pain assoc with headache for 2 days.  Daughter had cold last week.      Stopped smoking year ago.      Took zyzal when sx first started and didn't improve - continued to get worse.      See ROS for additional symptoms and/or pertinent negatives.     Immunosuppressed, on Remicade infusions for Crohn's.      History obtained from the patient.    Past Medical History:   Diagnosis Date   ? Anxiety    ? Asthma    ? Crohn's disease (H)    ? Depression    ? Insomnia        Active Ambulatory (Non-Hospital) Problems    Diagnosis   ? Allergy to cats   ? Allergy to dogs   ? Mild intermittent asthma   ? Acne vulgaris   ? Atypical nevi   ? Intra-abdominal abscess (H)  "  ? Abscess, intra-abdominal, postoperative, initial encounter   ? Pelvic abscess in female   ? Lower abdominal pain   ? CD (Crohn's disease) (H)   ? Crohn's disease (H)   ? Cannot sleep   ? Insomnia, unspecified   ? Anxiety and depression   ? Depression with anxiety   ? Compulsive tobacco user syndrome   ? Tobacco use disorder       Family History   Problem Relation Age of Onset   ? Crohn's disease Mother    ? Crohn's disease Father    ? Diabetes type II Maternal Grandmother    ? Crohn's disease Sister        Social History     Tobacco Use   ? Smoking status: Former Smoker     Types: Cigarettes   ? Smokeless tobacco: Never Used   Substance Use Topics   ? Alcohol use: No     Comment: social       Review of Systems   Constitutional: Negative for chills and fever.   HENT: Positive for congestion and ear pain. Negative for sore throat.    Respiratory: Positive for cough.    Musculoskeletal: Positive for myalgias.   Allergic/Immunologic: Positive for environmental allergies (\"a little hayfever\" ).       OBJECTIVE    Vitals:    19 0710   BP: 104/67   Pulse: 67   Resp: 12   Temp: 98.4  F (36.9  C)   TempSrc: Oral   SpO2: 98%   Weight: 134 lb 3.2 oz (60.9 kg)       Physical Exam  Constitutional:       General: She is not in acute distress.     Appearance: She is well-developed.   HENT:      Right Ear: External ear normal. A middle ear effusion is present.      Left Ear: External ear normal. A middle ear effusion is present.      Nose: No nasal tenderness.      Right Sinus: No maxillary sinus tenderness or frontal sinus tenderness.      Left Sinus: No maxillary sinus tenderness or frontal sinus tenderness.      Mouth/Throat:      Mouth: Mucous membranes are moist.      Pharynx: Posterior oropharyngeal erythema present.      Tonsils: No tonsillar exudate or tonsillar abscesses. Swellin+ on the right. 1+ on the left.   Eyes:      General:         Right eye: No discharge.         Left eye: No discharge.      " Conjunctiva/sclera: Conjunctivae normal.   Pulmonary:      Effort: Pulmonary effort is normal.   Musculoskeletal: Normal range of motion.   Lymphadenopathy:      Cervical: No cervical adenopathy.   Skin:     General: Skin is warm and dry.      Capillary Refill: Capillary refill takes less than 2 seconds.   Neurological:      Mental Status: She is alert and oriented to person, place, and time.   Psychiatric:         Behavior: Behavior normal.         Thought Content: Thought content normal.         Judgement: Judgment normal.         Labs:  No results found for this or any previous visit (from the past 240 hour(s)).      Radiology:    No results found.    PATIENT INSTRUCTIONS:   Patient Instructions   Recommend Neti Pot/Sinus Rinse and/or Afrin (only use 3 days) for congestion/sinus pressure.    Productive cough = Mucinex will help get mucus out.    Dry cough = Dayquil or Nyquil.      Tea with 1 tsp of honey for cough.      Tylenol (acetaminophen) or ibuprofen as needed for discomfort if not included in cough medicine.       Patient Education     Viral Upper Respiratory Illness (Adult)  You have a viral upper respiratory illness (URI), which is another term for the common cold. This illness is contagious during the first few days. It is spread through the air by coughing and sneezing. It may also be spread by direct contact (touching the sick person and then touching your own eyes, nose, or mouth). Frequent handwashing will decrease risk of spread. Most viral illnesses go away within 7 to 10 days with rest and simple home remedies. Sometimes the illness may last for several weeks. Antibiotics will not kill a virus, and they are generally not prescribed for this condition.    Home care    If symptoms are severe, rest at home for the first 2 to 3 days. When you resume activity, don't let yourself get too tired.    Avoid being exposed to cigarette smoke (yours or others).    You may use acetaminophen or ibuprofen to  control pain and fever, unless another medicine was prescribed. If you have chronic liver or kidney disease, have ever had a stomach ulcer or gastrointestinal bleeding, or are taking blood-thinning medicines, talk with your healthcare provider before using these medicines. Aspirin should never be given to anyone under 18 years of age who is ill with a viral infection or fever. It may cause severe liver or brain damage.    Your appetite may be poor, so a light diet is fine. Avoid dehydration by drinking 6 to 8 glasses of fluids per day (water, soft drinks, juices, tea, or soup). Extra fluids will help loosen secretions in the nose and lungs.    Over-the-counter cold medicines will not shorten the length of time youre sick, but they may be helpful for the following symptoms: cough, sore throat, and nasal and sinus congestion. (Note: Do not use decongestants if you have high blood pressure.)  Follow-up care  Follow up with your healthcare provider, or as advised.  When to seek medical advice  Call your healthcare provider right away if any of these occur:    Cough with lots of colored sputum (mucus)    Severe headache; face, neck, or ear pain    Difficulty swallowing due to throat pain    Fever of 100.4 F (38 C) or higher, or as directed by your healthcare provider  Call 911  Call 911 if any of these occur:    Chest pain, shortness of breath, wheezing, or difficulty breathing    Coughing up blood    Inability to swallow due to throat pain  Date Last Reviewed: 9/13/2015 2000-2017 [email or web address]. [address] [place] [address], [place] [address]. All rights reserved. This information is not intended as a substitute for professional medical care. Always follow your healthcare professional's instructions.

## 2021-07-03 NOTE — ADDENDUM NOTE
Addendum Note by Dorothea Gaviria PA-C at 9/23/2019  5:16 PM     Author: Dorothea Gaviria PA-C Service: -- Author Type: Physician Assistant    Filed: 9/23/2019  5:16 PM Encounter Date: 9/23/2019 Status: Signed    : Dorothea Gaviria PA-C (Physician Assistant)    Addended by: DOROTHEA GAVIRIA on: 9/23/2019 05:16 PM        Modules accepted: Orders

## 2021-07-11 ENCOUNTER — HEALTH MAINTENANCE LETTER (OUTPATIENT)
Age: 46
End: 2021-07-11

## 2021-09-05 ENCOUNTER — HEALTH MAINTENANCE LETTER (OUTPATIENT)
Age: 46
End: 2021-09-05

## 2022-06-12 ENCOUNTER — HEALTH MAINTENANCE LETTER (OUTPATIENT)
Age: 47
End: 2022-06-12

## 2022-08-07 ENCOUNTER — HEALTH MAINTENANCE LETTER (OUTPATIENT)
Age: 47
End: 2022-08-07

## 2022-10-23 ENCOUNTER — HEALTH MAINTENANCE LETTER (OUTPATIENT)
Age: 47
End: 2022-10-23

## 2023-04-07 ENCOUNTER — LAB REQUISITION (OUTPATIENT)
Dept: LAB | Facility: CLINIC | Age: 48
End: 2023-04-07

## 2023-04-07 DIAGNOSIS — Z82.41 FAMILY HISTORY OF SUDDEN CARDIAC DEATH: ICD-10-CM

## 2023-04-07 DIAGNOSIS — E04.1 NONTOXIC SINGLE THYROID NODULE: ICD-10-CM

## 2023-04-07 DIAGNOSIS — Z13.1 ENCOUNTER FOR SCREENING FOR DIABETES MELLITUS: ICD-10-CM

## 2023-04-07 LAB
CHOLEST SERPL-MCNC: 195 MG/DL
HBA1C MFR BLD: 5.3 %
HDLC SERPL-MCNC: 69 MG/DL
LDLC SERPL CALC-MCNC: 103 MG/DL
NONHDLC SERPL-MCNC: 126 MG/DL
TRIGL SERPL-MCNC: 115 MG/DL
TSH SERPL DL<=0.005 MIU/L-ACNC: 2.9 UIU/ML (ref 0.3–4.2)

## 2023-04-07 PROCEDURE — 83036 HEMOGLOBIN GLYCOSYLATED A1C: CPT | Performed by: OBSTETRICS & GYNECOLOGY

## 2023-04-07 PROCEDURE — 80061 LIPID PANEL: CPT | Performed by: OBSTETRICS & GYNECOLOGY

## 2023-04-07 PROCEDURE — 84443 ASSAY THYROID STIM HORMONE: CPT | Performed by: OBSTETRICS & GYNECOLOGY

## 2023-05-02 ENCOUNTER — HOSPITAL ENCOUNTER (EMERGENCY)
Facility: HOSPITAL | Age: 48
Discharge: HOME | End: 2023-05-02
Attending: EMERGENCY MEDICINE | Admitting: EMERGENCY MEDICINE
Payer: COMMERCIAL

## 2023-05-02 ENCOUNTER — APPOINTMENT (OUTPATIENT)
Dept: CT IMAGING | Facility: HOSPITAL | Age: 48
End: 2023-05-02
Payer: COMMERCIAL

## 2023-05-02 ENCOUNTER — APPOINTMENT (OUTPATIENT)
Dept: ULTRASOUND IMAGING | Facility: HOSPITAL | Age: 48
End: 2023-05-02
Payer: COMMERCIAL

## 2023-05-02 VITALS
HEART RATE: 86 BPM | DIASTOLIC BLOOD PRESSURE: 66 MMHG | OXYGEN SATURATION: 98 % | HEIGHT: 65 IN | TEMPERATURE: 99.3 F | RESPIRATION RATE: 16 BRPM | WEIGHT: 140 LBS | BODY MASS INDEX: 23.32 KG/M2 | SYSTOLIC BLOOD PRESSURE: 117 MMHG

## 2023-05-02 DIAGNOSIS — Z87.19 HISTORY OF CROHN'S DISEASE: ICD-10-CM

## 2023-05-02 DIAGNOSIS — L03.317 CELLULITIS OF BUTTOCK, RIGHT: ICD-10-CM

## 2023-05-02 DIAGNOSIS — R59.0 INGUINAL LYMPHADENOPATHY: ICD-10-CM

## 2023-05-02 LAB
ALBUMIN SERPL BCG-MCNC: 4 G/DL (ref 3.5–5.2)
ALP SERPL-CCNC: 77 U/L (ref 35–104)
ALT SERPL W P-5'-P-CCNC: 21 U/L (ref 10–35)
ANION GAP SERPL CALCULATED.3IONS-SCNC: 11 MMOL/L (ref 7–15)
AST SERPL W P-5'-P-CCNC: 25 U/L (ref 10–35)
BASOPHILS # BLD AUTO: 0 10E3/UL (ref 0–0.2)
BASOPHILS NFR BLD AUTO: 0 %
BILIRUB DIRECT SERPL-MCNC: <0.2 MG/DL (ref 0–0.3)
BILIRUB SERPL-MCNC: 0.6 MG/DL
BUN SERPL-MCNC: 10 MG/DL (ref 6–20)
CALCIUM SERPL-MCNC: 8.8 MG/DL (ref 8.6–10)
CHLORIDE SERPL-SCNC: 106 MMOL/L (ref 98–107)
CREAT SERPL-MCNC: 0.68 MG/DL (ref 0.51–0.95)
CRP SERPL-MCNC: <3 MG/L
DEPRECATED HCO3 PLAS-SCNC: 24 MMOL/L (ref 22–29)
EOSINOPHIL # BLD AUTO: 0.1 10E3/UL (ref 0–0.7)
EOSINOPHIL NFR BLD AUTO: 1 %
ERYTHROCYTE [DISTWIDTH] IN BLOOD BY AUTOMATED COUNT: 12.1 % (ref 10–15)
ERYTHROCYTE [SEDIMENTATION RATE] IN BLOOD BY WESTERGREN METHOD: 10 MM/HR (ref 0–20)
GFR SERPL CREATININE-BSD FRML MDRD: >90 ML/MIN/1.73M2
GLUCOSE SERPL-MCNC: 93 MG/DL (ref 70–99)
HCG UR QL: NEGATIVE
HCT VFR BLD AUTO: 41.1 % (ref 35–47)
HGB BLD-MCNC: 13.7 G/DL (ref 11.7–15.7)
HOLD SPECIMEN: NORMAL
HOLD SPECIMEN: NORMAL
IMM GRANULOCYTES # BLD: 0 10E3/UL
IMM GRANULOCYTES NFR BLD: 0 %
LIPASE SERPL-CCNC: 24 U/L (ref 13–60)
LYMPHOCYTES # BLD AUTO: 2 10E3/UL (ref 0.8–5.3)
LYMPHOCYTES NFR BLD AUTO: 18 %
MAGNESIUM SERPL-MCNC: 1.9 MG/DL (ref 1.7–2.3)
MCH RBC QN AUTO: 31.6 PG (ref 26.5–33)
MCHC RBC AUTO-ENTMCNC: 33.3 G/DL (ref 31.5–36.5)
MCV RBC AUTO: 95 FL (ref 78–100)
MONOCYTES # BLD AUTO: 0.9 10E3/UL (ref 0–1.3)
MONOCYTES NFR BLD AUTO: 8 %
NEUTROPHILS # BLD AUTO: 7.7 10E3/UL (ref 1.6–8.3)
NEUTROPHILS NFR BLD AUTO: 73 %
NRBC # BLD AUTO: 0 10E3/UL
NRBC BLD AUTO-RTO: 0 /100
PLATELET # BLD AUTO: 205 10E3/UL (ref 150–450)
POTASSIUM SERPL-SCNC: 4.2 MMOL/L (ref 3.4–5.3)
PROCALCITONIN SERPL IA-MCNC: <0.02 NG/ML
PROT SERPL-MCNC: 6.6 G/DL (ref 6.4–8.3)
RBC # BLD AUTO: 4.33 10E6/UL (ref 3.8–5.2)
SODIUM SERPL-SCNC: 141 MMOL/L (ref 136–145)
WBC # BLD AUTO: 10.7 10E3/UL (ref 4–11)

## 2023-05-02 PROCEDURE — 96366 THER/PROPH/DIAG IV INF ADDON: CPT

## 2023-05-02 PROCEDURE — 258N000003 HC RX IP 258 OP 636: Performed by: EMERGENCY MEDICINE

## 2023-05-02 PROCEDURE — 82248 BILIRUBIN DIRECT: CPT | Performed by: EMERGENCY MEDICINE

## 2023-05-02 PROCEDURE — 81025 URINE PREGNANCY TEST: CPT

## 2023-05-02 PROCEDURE — 85025 COMPLETE CBC W/AUTO DIFF WBC: CPT

## 2023-05-02 PROCEDURE — 86140 C-REACTIVE PROTEIN: CPT | Performed by: EMERGENCY MEDICINE

## 2023-05-02 PROCEDURE — 83690 ASSAY OF LIPASE: CPT | Performed by: EMERGENCY MEDICINE

## 2023-05-02 PROCEDURE — 83735 ASSAY OF MAGNESIUM: CPT | Performed by: EMERGENCY MEDICINE

## 2023-05-02 PROCEDURE — 96365 THER/PROPH/DIAG IV INF INIT: CPT | Mod: 59

## 2023-05-02 PROCEDURE — 36415 COLL VENOUS BLD VENIPUNCTURE: CPT

## 2023-05-02 PROCEDURE — 99285 EMERGENCY DEPT VISIT HI MDM: CPT | Mod: 25

## 2023-05-02 PROCEDURE — 250N000011 HC RX IP 250 OP 636: Performed by: EMERGENCY MEDICINE

## 2023-05-02 PROCEDURE — 96361 HYDRATE IV INFUSION ADD-ON: CPT

## 2023-05-02 PROCEDURE — 250N000011 HC RX IP 250 OP 636

## 2023-05-02 PROCEDURE — 74177 CT ABD & PELVIS W/CONTRAST: CPT

## 2023-05-02 PROCEDURE — 84145 PROCALCITONIN (PCT): CPT | Performed by: EMERGENCY MEDICINE

## 2023-05-02 PROCEDURE — 87040 BLOOD CULTURE FOR BACTERIA: CPT

## 2023-05-02 PROCEDURE — 80053 COMPREHEN METABOLIC PANEL: CPT

## 2023-05-02 PROCEDURE — 85652 RBC SED RATE AUTOMATED: CPT | Performed by: EMERGENCY MEDICINE

## 2023-05-02 PROCEDURE — 93971 EXTREMITY STUDY: CPT | Mod: RT

## 2023-05-02 RX ORDER — PIPERACILLIN SODIUM, TAZOBACTAM SODIUM 3; .375 G/15ML; G/15ML
3.38 INJECTION, POWDER, LYOPHILIZED, FOR SOLUTION INTRAVENOUS ONCE
Status: COMPLETED | OUTPATIENT
Start: 2023-05-02 | End: 2023-05-02

## 2023-05-02 RX ORDER — CLINDAMYCIN HCL 150 MG
450 CAPSULE ORAL 3 TIMES DAILY
Qty: 126 CAPSULE | Refills: 0 | Status: SHIPPED | OUTPATIENT
Start: 2023-05-02 | End: 2023-05-16

## 2023-05-02 RX ORDER — IOPAMIDOL 755 MG/ML
90 INJECTION, SOLUTION INTRAVASCULAR ONCE
Status: COMPLETED | OUTPATIENT
Start: 2023-05-02 | End: 2023-05-02

## 2023-05-02 RX ADMIN — IOPAMIDOL 90 ML: 755 INJECTION, SOLUTION INTRAVENOUS at 11:01

## 2023-05-02 RX ADMIN — PIPERACILLIN AND TAZOBACTAM 3.38 G: 3; .375 INJECTION, POWDER, LYOPHILIZED, FOR SOLUTION INTRAVENOUS at 10:05

## 2023-05-02 RX ADMIN — SODIUM CHLORIDE 1000 ML: 9 INJECTION, SOLUTION INTRAVENOUS at 09:31

## 2023-05-02 ASSESSMENT — ENCOUNTER SYMPTOMS
ANAL BLEEDING: 1
FEVER: 0
DIARRHEA: 1
DYSURIA: 0
HEMATURIA: 0
BLOOD IN STOOL: 0
COLOR CHANGE: 0
WOUND: 1
CHILLS: 0
SHORTNESS OF BREATH: 0
ABDOMINAL PAIN: 1
RECTAL PAIN: 1

## 2023-05-02 ASSESSMENT — ACTIVITIES OF DAILY LIVING (ADL)
ADLS_ACUITY_SCORE: 35
ADLS_ACUITY_SCORE: 33

## 2023-05-02 NOTE — ED PROVIDER NOTES
EMERGENCY DEPARTMENT ENCOUNTER      NAME: Selam Tomlinson  AGE: 48 year old female  YOB: 1975  MRN: [id number]  EVALUATION DATE & TIME: 5/2/2023  8:02 AM    PCP: Diallo Watson        Chief Complaint   Patient presents with     Leg Pain     Groin Pain     Rectal/perineal Pain         IMPRESSION  1. Cellulitis of buttock, right    2. Inguinal lymphadenopathy    3. History of Crohn's disease        PLAN  - clindamycin 450mg q8h x14 days  - Augmentin BID x14 days  - NSAIDs for pain  - close PCP follow up  - discharge to home    ED COURSE & MEDICAL DECISION MAKING    8:10 AM I was consulted by Sandra Encarnacion PA-C on this patient. I reviewed ED presentation history, assessment, management, plan to this point. Please see ED JOSE note for details. I evaluated patient and performed H&P.      Briefly, 48yoF with history of Crohn's (takes Remicade, s/p partial colectomy, prior intraabdominal abscess) presenting with right buttock cellulitis & associated right inguinal lymphadenopathy. Ongoing x1 week and worsening so came to the ED.    CT obtained and no EC fistula, perirectal abscess, perianal abscess; no DVT either (wondered about this but correlates more with lymphadenopathy). No other acute abnormality or explanatory pathology on CT. No concern for Crohn's flare given CT with negative ESR/CRP with no leukocytosis and negative procal. Labs unremarkable. Has buttock cellulitis; locates about 3cm from anal verge so will cover both gram positives & negatives with Augmentin & clindamycin as outpatient. Patient comfortable with this plan and discharge home at this time. Return precautions and need for PCP follow up discussed and understood. No further questions at the time of discharge.      This patient involved a high degree of complexity in medical decision making, as significant risks were present and assessed. Recent encounters & results in medical record reviewed by me.    All workup (i.e. any  EKG/labs/imaging as per charting below) reviewed and independently interpreted by me. See respective sections for details.    Broad differential considered for this patient presenting, including but not limited to:  Cellulitis, abscess (perianal vs perirectal), EC fistula, necrotizing fasciitis, sepsis, lymphadenopathy, DVT      I wore the following PPE during this patient encounter:  N95 mask, face shield w/ eye protection, gloves    I personally saw the patient and performed a substantive portion of the visit including all aspects of the medical decision making.    MEDICATIONS GIVEN IN THE EMERGENCY DEPARTMENT  Medications   piperacillin-tazobactam (ZOSYN) 3.375 g vial to attach to  mL bag (3.375 g Intravenous $New Bag 5/2/23 1005)   0.9% sodium chloride BOLUS (0 mLs Intravenous Stopped 5/2/23 1055)   iopamidol (ISOVUE-370) solution 90 mL (90 mLs Intravenous $Given 5/2/23 1101)       NEW PRESCRIPTIONS STARTED AT TODAY'S ER VISIT  Current Discharge Medication List      START taking these medications    Details   amoxicillin-clavulanate (AUGMENTIN) 875-125 MG tablet Take 1 tablet by mouth 2 times daily  Qty: 28 tablet, Refills: 0      clindamycin (CLEOCIN) 150 MG capsule Take 3 capsules (450 mg) by mouth 3 times daily for 14 days  Qty: 126 capsule, Refills: 0         CONTINUE these medications which have NOT CHANGED    Details   albuterol (PROAIR HFA;PROVENTIL HFA;VENTOLIN HFA) 90 mcg/actuation inhaler [ALBUTEROL (PROAIR HFA;PROVENTIL HFA;VENTOLIN HFA) 90 MCG/ACTUATION INHALER] Inhale 1-2 puffs every 4 (four) hours as needed for wheezing or shortness of breath.  Qty: 8.5 g, Refills: 3    Comments: May substitute the equivalent medication per insurance preference.  Associated Diagnoses: Acute non-recurrent frontal sinusitis      citalopram (CELEXA) 40 MG tablet [CITALOPRAM (CELEXA) 40 MG TABLET] Take 1 tablet (40 mg total) by mouth daily.  Qty: 90 tablet, Refills: 3    Associated Diagnoses: Acute non-recurrent  "frontal sinusitis      INFLIXIMAB (REMICADE IV) [INFLIXIMAB (REMICADE IV)] Infuse into a venous catheter.      levonorgestrel (MIRENA) 20 mcg/24 hr (5 years) IUD [LEVONORGESTREL (MIRENA) 20 MCG/24 HR (5 YEARS) IUD] 1 each by Intrauterine route.      LORazepam (ATIVAN) 0.5 MG tablet [LORAZEPAM (ATIVAN) 0.5 MG TABLET] Take 1 tablet (0.5 mg total) by mouth once as needed for anxiety.  Qty: 15 tablet, Refills: 0    Associated Diagnoses: Acute non-recurrent frontal sinusitis      oxyCODONE (ROXICODONE) 5 MG immediate release tablet [OXYCODONE (ROXICODONE) 5 MG IMMEDIATE RELEASE TABLET] Take 1 tablet (5 mg total) by mouth every 6 (six) hours as needed for pain.  Qty: 8 tablet, Refills: 0    Associated Diagnoses: Partial thickness burn of left foot, initial encounter                 =================================================================      HPI  Patient information was obtained from: patient     Use of : N/A         Selam Tomlinson is a 48 year old female with a pertinent history of Chron's disease, pelvic abscess, asthma, and anxiety who presents to this ED by walk in solo for evaluation of rectal, groin, and leg pain.      Patient reports developing \"hemorrhoids\" to her buttocks ~4 weeks ago after returning back from [place]. The patient states that her hemorrhoids have worsened gradually with time, reporting that she has also developed right leg pain. Her pain worsens with sitting, standing, and walking. The patient adds that after using the bathroom and wiping, she has some blood, potentially from the hemorrhoids. She denies a history of external abscesses, but notes that she had an internal pelvic abscess in the past. The patient was able to call her provider about her symptoms and they prompted her to make an appointment with colorectal for today at 11:30 AM (~3 hours from now).      Today, the patient reports developing right groin pain, described as \"vein swelling.\" She was concerned for a " possible blood clot, prompting her to be present in the ED.  The patient adds that manual pressure worsens the pain. No other complaints or concerns expressed at this time.           --------------- MEDICAL HISTORY ---------------  PAST MEDICAL HISTORY:  Reviewed by me.  Past Medical History:   Diagnosis Date     Anxiety      Asthma      Crohn's disease (H)      Depression      Insomnia      Patient Active Problem List   Diagnosis     Pelvic abscess in female     CD (Crohn's disease) (H)     Anxiety and depression     Cannot sleep     Compulsive tobacco user syndrome     Lower abdominal pain     Abscess, intra-abdominal, postoperative, initial encounter     Intra-abdominal abscess (H)     Acne vulgaris     Allergy to cats     Allergy to dogs     Atypical nevi     Mild intermittent asthma     Tobacco use disorder     Depression with anxiety     Insomnia, unspecified     Crohn's disease (H)       PAST SURGICAL HISTORY:  Reviewed by me.  Past Surgical History:   Procedure Laterality Date     NO PAST SURGERIES       ZZC PART REMOVAL COLON W ANASTOMOSIS N/A 1/17/2016    Procedure: EXPLORATORY LAPAROTOMY, ILEO CECAL RESECTION, ILEO COLOSTOMY;  Surgeon: Alexandre Ledesma MD;  Location: [facility];  Service: General       CURRENT MEDICATIONS:    Reviewed by me.  No current facility-administered medications for this encounter.    Current Outpatient Medications:      amoxicillin-clavulanate (AUGMENTIN) 875-125 MG tablet, Take 1 tablet by mouth 2 times daily, Disp: 28 tablet, Rfl: 0     clindamycin (CLEOCIN) 150 MG capsule, Take 3 capsules (450 mg) by mouth 3 times daily for 14 days, Disp: 126 capsule, Rfl: 0     albuterol (PROAIR HFA;PROVENTIL HFA;VENTOLIN HFA) 90 mcg/actuation inhaler, [ALBUTEROL (PROAIR HFA;PROVENTIL HFA;VENTOLIN HFA) 90 MCG/ACTUATION INHALER] Inhale 1-2 puffs every 4 (four) hours as needed for wheezing or shortness of breath., Disp: 8.5 g, Rfl: 3     citalopram (CELEXA) 40 MG tablet, [CITALOPRAM  "(CELEXA) 40 MG TABLET] Take 1 tablet (40 mg total) by mouth daily., Disp: 90 tablet, Rfl: 3     INFLIXIMAB (REMICADE IV), [INFLIXIMAB (REMICADE IV)] Infuse into a venous catheter., Disp: , Rfl:      levonorgestrel (MIRENA) 20 mcg/24 hr (5 years) IUD, [LEVONORGESTREL (MIRENA) 20 MCG/24 HR (5 YEARS) IUD] 1 each by Intrauterine route., Disp: , Rfl:      LORazepam (ATIVAN) 0.5 MG tablet, [LORAZEPAM (ATIVAN) 0.5 MG TABLET] Take 1 tablet (0.5 mg total) by mouth once as needed for anxiety., Disp: 15 tablet, Rfl: 0     oxyCODONE (ROXICODONE) 5 MG immediate release tablet, [OXYCODONE (ROXICODONE) 5 MG IMMEDIATE RELEASE TABLET] Take 1 tablet (5 mg total) by mouth every 6 (six) hours as needed for pain., Disp: 8 tablet, Rfl: 0    ALLERGIES:  Reviewed by me.  Allergies   Allergen Reactions     Seasonal Allergies Other (See Comments)     Running nose, congestion       FAMILY HISTORY:  Reviewed by me.  Family History   Problem Relation Age of Onset     Crohn's Disease Mother      Crohn's Disease Father      Diabetes Type 2  Maternal Grandmother      Crohn's Disease Sister        SOCIAL HISTORY:   Reviewed by me.  Social History     Socioeconomic History     Marital status:      Spouse name: None     Number of children: None     Years of education: None     Highest education level: None   Tobacco Use     Smoking status: Former     Types: Cigarettes     Smokeless tobacco: Never   Substance and Sexual Activity     Alcohol use: No     Comment: Alcoholic Drinks/day: social     Drug use: No         --------------- PHYSICAL EXAM ---------------  Nursing notes and vitals independently reviewed by me.  VITALS:  Vitals:    05/02/23 0745 05/02/23 0746   BP:  117/66   BP Location:  Left arm   Patient Position:  Chair   Cuff Size:  Adult Regular   Pulse:  86   Resp:  16   Temp:  99.3  F (37.4  C)   TempSrc:  Oral   SpO2:  98%   Weight: 63.5 kg (140 lb)    Height: 1.651 m (5' 5\")        PHYSICAL EXAM:    General:  alert, interactive, no " distress  Eyes:  conjunctivae clear, conjugate gaze  HENT:  atraumatic, nose with no rhinorrhea, oropharynx clear  Neck:  no meningismus  Cardiovascular:  HR 80s during exam, regular rhythm, no murmurs, brisk cap refill  Chest:  no chest wall tenderness  Pulmonary:  no stridor, normal phonation, normal work of breathing, clear lungs bilaterally  Abdomen: soft, nondistended, nontender  :  no CVA tenderness  Back:  no midline spinal tenderness  Musculoskeletal:  no pretibial edema, no calf tenderness. Gross ROM intact to joints of extremities with no obvious deformities.  Skin:  warm, dry  - tender erythema to right buttock about 3cm lateral to anal verge with no purulence, no crepitus, no streaking skin redness, no fluctuance  - small tenderness shotty lymphadenopathy to right inguinal area with no overlying skin changes  Neuro:  awake, alert, answers questions appropriately, follows commands, moves all limbs  Psych:  calm, normal affect      --------------- RESULTS ---------------  LAB:  Reviewed and independently interpreted by me.  Results for orders placed or performed during the hospital encounter of 05/02/23   CT Abdomen Pelvis w Contrast    Impression    IMPRESSION:   1.  Mild dermal thickening and subdermal subcutaneous edema along the right gluteal cleft just inferior to the anal verge, but no abscess or visible perianal fistula.  2.  Ileocecal resection with ileal ascending colostomy.   3.  No bowel obstruction or inflammatory change. Tiny calcified stone in the otherwise normal-appearing gallbladder.   US Lower Extremity Venous Duplex Right    Impression    IMPRESSION:  1.  No deep venous thrombosis in the right lower extremity.  2.  Mild subcutaneous edema right groin with associated mild reactive right inguinal lymph node enlargement.   Basic metabolic panel   Result Value Ref Range    Sodium 141 136 - 145 mmol/L    Potassium 4.2 3.4 - 5.3 mmol/L    Chloride 106 98 - 107 mmol/L    Carbon Dioxide (CO2)  24 22 - 29 mmol/L    Anion Gap 11 7 - 15 mmol/L    Urea Nitrogen 10.0 6.0 - 20.0 mg/dL    Creatinine 0.68 0.51 - 0.95 mg/dL    Calcium 8.8 8.6 - 10.0 mg/dL    Glucose 93 70 - 99 mg/dL    GFR Estimate >90 >60 mL/min/1.73m2   Hepatic function panel   Result Value Ref Range    Protein Total 6.6 6.4 - 8.3 g/dL    Albumin 4.0 3.5 - 5.2 g/dL    Bilirubin Total 0.6 <=1.2 mg/dL    Alkaline Phosphatase 77 35 - 104 U/L    AST 25 10 - 35 U/L    ALT 21 10 - 35 U/L    Bilirubin Direct <0.20 0.00 - 0.30 mg/dL   Result Value Ref Range    Lipase 24 13 - 60 U/L   Result Value Ref Range    CRP Inflammation <3.00 <5.00 mg/L   Erythrocyte sedimentation rate auto   Result Value Ref Range    Erythrocyte Sedimentation Rate 10 0 - 20 mm/hr   Result Value Ref Range    Procalcitonin <0.02 <0.05 ng/mL   Result Value Ref Range    Magnesium 1.9 1.7 - 2.3 mg/dL   CBC with platelets and differential   Result Value Ref Range    WBC Count 10.7 4.0 - 11.0 10e3/uL    RBC Count 4.33 3.80 - 5.20 10e6/uL    Hemoglobin 13.7 11.7 - 15.7 g/dL    Hematocrit 41.1 35.0 - 47.0 %    MCV 95 78 - 100 fL    MCH 31.6 26.5 - 33.0 pg    MCHC 33.3 31.5 - 36.5 g/dL    RDW 12.1 10.0 - 15.0 %    Platelet Count 205 150 - 450 10e3/uL    % Neutrophils 73 %    % Lymphocytes 18 %    % Monocytes 8 %    % Eosinophils 1 %    % Basophils 0 %    % Immature Granulocytes 0 %    NRBCs per 100 WBC 0 <1 /100    Absolute Neutrophils 7.7 1.6 - 8.3 10e3/uL    Absolute Lymphocytes 2.0 0.8 - 5.3 10e3/uL    Absolute Monocytes 0.9 0.0 - 1.3 10e3/uL    Absolute Eosinophils 0.1 0.0 - 0.7 10e3/uL    Absolute Basophils 0.0 0.0 - 0.2 10e3/uL    Absolute Immature Granulocytes 0.0 <=0.4 10e3/uL    Absolute NRBCs 0.0 10e3/uL   Extra Blue Top Tube   Result Value Ref Range    Hold Specimen JIC    Extra Red Top Tube   Result Value Ref Range    Hold Specimen JIC    HCG qualitative urine   Result Value Ref Range    hCG Urine Qualitative Negative Negative       RADIOLOGY:  Reviewed and independently  interpreted by me. Please see official radiology report.  Recent Results (from the past 24 hour(s))   CT Abdomen Pelvis w Contrast    Narrative    EXAM: CT ABDOMEN PELVIS W CONTRAST  LOCATION: [facility]  DATE/TIME: 5/2/2023 11:02 AM CDT    INDICATION: Perianal abscess. History of Crohn's.  COMPARISON: None.  TECHNIQUE: CT scan of the abdomen and pelvis was performed following injection of IV contrast. Multiplanar reformats were obtained. Dose reduction techniques were used.  CONTRAST: 90 mL Isovue 370    FINDINGS:   LOWER CHEST: Normal.    HEPATOBILIARY: Liver is normal. No bile duct dilatation. Tiny calcified stone in the otherwise normal-appearing gallbladder.    PANCREAS: Normal.    SPLEEN: Normal.    ADRENAL GLANDS: Normal.    KIDNEYS/BLADDER: Normal.    BOWEL: Ileocecal resection with ileal ascending colostomy. No bowel obstruction or inflammatory change. No free air. No free fluid.    LYMPH NODES: Normal.    VASCULATURE: Unremarkable.    PELVIC ORGANS: IUD appears well-positioned.    MUSCULOSKELETAL: Mild dermal thickening and subdermal subcutaneous edema along the right gluteal cleft just inferior to the anal verge, but no abscess or visible perianal fistula.      Impression    IMPRESSION:   1.  Mild dermal thickening and subdermal subcutaneous edema along the right gluteal cleft just inferior to the anal verge, but no abscess or visible perianal fistula.  2.  Ileocecal resection with ileal ascending colostomy.   3.  No bowel obstruction or inflammatory change. Tiny calcified stone in the otherwise normal-appearing gallbladder.   US Lower Extremity Venous Duplex Right    Narrative    EXAM: US LOWER EXTREMITY VENOUS DUPLEX RIGHT  LOCATION: [facility]  DATE/TIME: 5/2/2023 11:30 AM CDT    INDICATION: Pain proximal medial thigh.  COMPARISON: None.  TECHNIQUE: Venous Duplex ultrasound of the right lower extremity with and without compression, augmentation and  duplex. Color flow and spectral Doppler with waveform analysis performed.    FINDINGS: Exam includes the common femoral, femoral, popliteal, and contralateral common femoral veins as well as segmentally visualized deep calf veins and greater saphenous vein.     RIGHT: No deep vein thrombosis. No superficial thrombophlebitis. No popliteal cyst. Mild subcutaneous edema right groin with associated mild reactive right inguinal lymph node enlargement.      Impression    IMPRESSION:  1.  No deep venous thrombosis in the right lower extremity.  2.  Mild subcutaneous edema right groin with associated mild reactive right inguinal lymph node enlargement.                   --------------- ADDITIONAL MDM ---------------  History:  - Supplemental history from:       -- see above charting, if applicable: patient  - External Record(s) reviewed:       -- see above charting, if applicable       -- Inpatient/outpatient record, prior labs, prior imaging    Workup:  - Chart documentation above includes differential considered and any EKGs or imaging independently interpreted by provider.  - In additional to work up documented, I considered the following work up:       -- see above charting, if applicable    External Consultation:  - Discussion of management with another provider:       -- see above charting, if applicable    Complicating Factors:  - Care impacted by chronic illness:       -- see above MDM, past medical history, & problem list  - Care affected by social determinants of health:       -- see above social history    Disposition Considerations:  - Discharg       -- I considered admission given that the patient came to the Emergency Department, but ultimately discharged the patient per decision making above       -- I recommended the patient continue their current prescription strength medication(s) as charted above in current medications list       -- I prescribed prescription strength medication(s) as charted above        -- I recommended over-the-counter medication(s) as charted above & in discharge instructions         I, Magy Huitron, am serving as a scribe to document services personally performed by Dr. Chad Low based on my observation and the provider's statements to me. I, Chad Low MD attest that Magy Huitron is acting in a scribe capacity, has observed my performance of the services and has documented them in accordance with my direction.      Chad Low MD  05/02/23  Emergency Medicine  [facility] EMERGENCY DEPARTMENT  [address] [place] [id number]  [phone]  Dept: [phone]     Chad Low MD  05/02/23 8923

## 2023-05-02 NOTE — DISCHARGE INSTRUCTIONS
Take the antibiotics (Augmentin & clindamycin) for the infection.    As needed for pain control at home, take:  - over-the-counter ibuprofen 800mg by mouth every 8 hours (max dose 2400mg in 24 hours)  - over-the-counter acetaminophen 1g by mouth every 6 hours (max dose 4g in 24 hours)    Continue all of your previously-prescribed medications as well.    Follow up with your Primary Care provider in 2 days for a recheck.    Return to the Emergency Department for any high fevers, persistent vomiting, severe worsening, or any other concerns.

## 2023-05-02 NOTE — ED PROVIDER NOTES
"EMERGENCY DEPARTMENT ENCOUNTER      NAME: Selam Tomlinson  AGE: 48 year old female  YOB: 1975  MRN: [id number]  EVALUATION DATE & TIME: No admission date for patient encounter.    PCP: No primary care provider on file.    ED PROVIDER: Sandra Bellamy PA-C    Chief Complaint   Patient presents with     Leg Pain     Groin Pain     Rectal/perineal Pain         FINAL IMPRESSION:  1. Cellulitis of buttock, right    2. Inguinal lymphadenopathy    3. History of Crohn's disease          ED COURSE & MEDICAL DECISION MAKIN:58 AM Met with patient for initial interview. Plan for care discussed.  8:09 AM Staffed patient with Dr. Low.   9:19 AM Reevaluated and updated patient. Still waiting on laboratories to be drawn.   12:04 PM Patient was discharged and updated by Dr. Low.     Pertinent Labs & Imaging studies reviewed. (See chart for details)  48 year old female with a history of Crohn's, intraabdominal abscess s/p resection, tobacco use disorder, presents to the Emergency Department for evaluation of right leg pain. Additionally, she notes a 4-week history of hemorrhoids and a \"sore\" on right buttock. Upon exam, patient is afebrile, hemodynamically stable, and in no acute distress. Erythematous, tender area of induration on right gluteal cleft concerning for perineal abscess. Mild right lower quadrant abdominal tenderness to palpation without guarding, rebound, or peritoneal signs. Cord-like tenderness in right inguinal crease with associated tenderness to palpation along proximal deep venous system without lower extremity swelling or skin changes. Differential diagnosis includes but not limited to perineal abscess vs cellulitis, intraabdominal abscess, fistula, Crohn's flare/colitis, appendicitis, pancreatitis, sepsis/bacteremia, DVT, lymphadenopathy, electrolyte abnormality, anemia, dehyration. Based on patient's presenting symptoms, laboratories and imaging were ordered.    Overall reassuring " work-up including CBC without leukocytosis. BMP WNL. HFP WNL. Lipase WNL. CRP WNL. ESR WNL. Procal WNL. Blood culture pending. CT revealed dermal thickening and subcutaneous edema along right gluteal cleft, but no abscess, perianal fistula, or other acute findings. US revealed mild subcutaneous edema of right growin with associated lymphadenopathy, negative for DVT.     Patient declined pain medication upon arrival. Patient was treated with IV Zosyn for broad coverage and possible intraabdominal etiology. Symptoms and workup most consistent with perianal cellulitis with surrounding lymphadenopathy. Patient was made aware of the above findings. Given cellulitis of gluteal cleft and closeness to anal verge, will plan for broad coverage with both Augmentin and Clindamycin for both gram positive and negative coverage. Additionally, right groin pain consistent with reactive lymphadenopathy. Plan to discharge patient home with symptomatic management including prescription Augmentin and Clindamycin, strict return precautions, and close follow up with their PCP for reevaluation and ongoing management. The patient was stable and well appearing throughout entire ER visit and upon discharge. The patient was advised to return to the ER if any new or worsening symptoms develop. The patient verbalizes understanding and agrees with the plan.      Medical Decision Making    History:    Supplemental history from: Documented in chart, if applicable    External Record(s) reviewed: Documented in chart, if applicable.    Work Up:    Chart documentation includes differential considered and any EKGs or imaging independently interpreted by provider, where specified.    In additional to work up documented, I considered the following work up: Documented in chart, if applicable.    External consultation:    Discussion of management with another provider: Documented in chart, if applicable    Complicating factors:    Care impacted by chronic  "illness: Mental Health and Other: Crohn's disease    Care affected by social determinants of health: N/A    Disposition considerations: Discharge. I prescribed additional prescription strength medication(s) as charted. I considered admission, but ultimately discharged patient affter reassuring imaging and work-up.        MEDICATIONS GIVEN IN THE EMERGENCY:  Medications   piperacillin-tazobactam (ZOSYN) 3.375 g vial to attach to  mL bag (0 g Intravenous Stopped 5/2/23 1225)   0.9% sodium chloride BOLUS (0 mLs Intravenous Stopped 5/2/23 1055)   iopamidol (ISOVUE-370) solution 90 mL (90 mLs Intravenous $Given 5/2/23 1101)       NEW PRESCRIPTIONS STARTED AT TODAY'S ER VISIT  Discharge Medication List as of 5/2/2023 12:25 PM      START taking these medications    Details   amoxicillin-clavulanate (AUGMENTIN) 875-125 MG tablet Take 1 tablet by mouth 2 times daily, Disp-28 tablet, R-0, E-Prescribe      clindamycin (CLEOCIN) 150 MG capsule Take 3 capsules (450 mg) by mouth 3 times daily for 14 days, Disp-126 capsule, R-0, E-Prescribe                =================================================================    HPI    Patient information was obtained from: patient    Use of : N/A        Selam Tomlinson is a 48 year old female with a pertinent history of Crohn's, pelvic abscess, asthma, anxiety who presents to this ED for evaluation of rectal, groin, and leg pain.     Patient reports developing \"hemorrhoids\" to her buttocks ~4 weeks ago after returning back from [place]. The patient states that her hemorrhoids have worsened gradually with time, reporting that she has also developed right leg pain. Her pain worsens with sitting, standing, and walking. The patient adds that after using the bathroom and wiping, she has some blood, potentially from the hemorrhoids. She denies a history of external abscesses, but notes that she had an internal pelvic abscess in the past. The patient was able to call her " "provider about her symptoms and they prompted her to make an appointment with colorectal for today at 11:30 AM (~3 hours from now). She reports irregular stooling chronically due to Crohn's, reporting an episode of painful stooling last night, but no bloody stools.     Today, the patient reports developing right groin pain, described as \"vein swelling.\" She was concerned for a possible blood clot, prompting her to be present in the ED.  The patient adds that manual pressure worsens the pain. The patient does not take estrogen supplements. She did fly to [place] about 3-4 weeks ago. Otherwise, she denies a history of DVT/PE, recent surgeries, lower extremity swelling/warmth/skin changes. She otherwise denies fevers, chills, and any other symptoms or complaints at this time.     Social Hx: Smokes a few cigarettes daily.     REVIEW OF SYSTEMS   Review of Systems   Constitutional: Negative for chills and fever.   Respiratory: Negative for shortness of breath.    Cardiovascular: Negative for chest pain and leg swelling.   Gastrointestinal: Positive for abdominal pain (right-sided), anal bleeding (hemorrhoids), diarrhea and rectal pain. Negative for blood in stool.   Genitourinary: Negative for dysuria, hematuria and vaginal discharge.        Positive for right groin pain.   Musculoskeletal:        Positive for leg pain.   Skin: Positive for wound. Negative for color change.   All other systems reviewed and are negative.      PAST MEDICAL HISTORY:  Past Medical History:   Diagnosis Date     Anxiety      Asthma      Crohn's disease (H)      Depression      Insomnia        PAST SURGICAL HISTORY:  Past Surgical History:   Procedure Laterality Date     NO PAST SURGERIES       ZZC PART REMOVAL COLON W ANASTOMOSIS N/A 1/17/2016    Procedure: EXPLORATORY LAPAROTOMY, ILEO CECAL RESECTION, ILEO COLOSTOMY;  Surgeon: Alexandre Ledesma MD;  Location: [facility];  Service: General           CURRENT MEDICATIONS:  " "  amoxicillin-clavulanate (AUGMENTIN) 875-125 MG tablet  clindamycin (CLEOCIN) 150 MG capsule  albuterol (PROAIR HFA;PROVENTIL HFA;VENTOLIN HFA) 90 mcg/actuation inhaler  citalopram (CELEXA) 40 MG tablet  INFLIXIMAB (REMICADE IV)  levonorgestrel (MIRENA) 20 mcg/24 hr (5 years) IUD  LORazepam (ATIVAN) 0.5 MG tablet  oxyCODONE (ROXICODONE) 5 MG immediate release tablet        ALLERGIES:  Allergies   Allergen Reactions     Seasonal Allergies Other (See Comments)     Running nose, congestion       FAMILY HISTORY:  Family History   Problem Relation Age of Onset     Crohn's Disease Mother      Crohn's Disease Father      Diabetes Type 2  Maternal Grandmother      Crohn's Disease Sister        SOCIAL HISTORY:   Social History     Socioeconomic History     Marital status:    Tobacco Use     Smoking status: Former     Types: Cigarettes     Smokeless tobacco: Never   Substance and Sexual Activity     Alcohol use: No     Comment: Alcoholic Drinks/day: social     Drug use: No       VITALS:  /66 (BP Location: Left arm, Patient Position: Chair, Cuff Size: Adult Regular)   Pulse 86   Temp 99.3  F (37.4  C) (Oral)   Resp 16   Ht 1.651 m (5' 5\")   Wt 63.5 kg (140 lb)   SpO2 98%   BMI 23.30 kg/m      PHYSICAL EXAM    Constitutional:  Alert, in no acute distress. Cooperative.  EYES: Conjunctivae clear.  HENT:  Atraumatic, normocephalic.  Respiratory:  Respirations even, unlabored, in no acute respiratory distress.  Cardiovascular:  Regular rate, good peripheral perfusion.  GI: Soft, flat, nondistended, RLQ abdominal tenderness to palpation without guarding, rebound, or peritoneal signs.  : Focal area of erythema, warmth, and tenderness along right gluteal cleft at 3:00, no obvious fluctuance. No obvious external hemorrhoids.   Musculoskeletal: Right lower extremity: tenderness to palpation over right inguinal crease and proximal deep venous system. Cord-like structure with associated tenderness along right " inguinal crease. No overlying erythema, warmth, purulence, fluctuance, edema, ecchymosis, crepitus, or other skin changes or bony deformity. Full ROM without pain. Neurovascularly intact distally. Cap refill <2 seconds. 2+ PT pulses bilaterally. 5/5 strength. Compartments soft. Mild proximal medial thigh tenderness to palpation, otherwise no tenderness along deep venous system distally. No associated lower extremity swelling or skin changes.   Integument: Warm, Dry, No erythema, No rash.   Neurologic:  Alert & oriented. No focal deficits noted.  Psych: Normal mood and affect.        LAB:  All pertinent labs reviewed and interpreted.  Results for orders placed or performed during the hospital encounter of 05/02/23   CT Abdomen Pelvis w Contrast    Impression    IMPRESSION:   1.  Mild dermal thickening and subdermal subcutaneous edema along the right gluteal cleft just inferior to the anal verge, but no abscess or visible perianal fistula.  2.  Ileocecal resection with ileal ascending colostomy.   3.  No bowel obstruction or inflammatory change. Tiny calcified stone in the otherwise normal-appearing gallbladder.   US Lower Extremity Venous Duplex Right    Impression    IMPRESSION:  1.  No deep venous thrombosis in the right lower extremity.  2.  Mild subcutaneous edema right groin with associated mild reactive right inguinal lymph node enlargement.   Basic metabolic panel   Result Value Ref Range    Sodium 141 136 - 145 mmol/L    Potassium 4.2 3.4 - 5.3 mmol/L    Chloride 106 98 - 107 mmol/L    Carbon Dioxide (CO2) 24 22 - 29 mmol/L    Anion Gap 11 7 - 15 mmol/L    Urea Nitrogen 10.0 6.0 - 20.0 mg/dL    Creatinine 0.68 0.51 - 0.95 mg/dL    Calcium 8.8 8.6 - 10.0 mg/dL    Glucose 93 70 - 99 mg/dL    GFR Estimate >90 >60 mL/min/1.73m2   Hepatic function panel   Result Value Ref Range    Protein Total 6.6 6.4 - 8.3 g/dL    Albumin 4.0 3.5 - 5.2 g/dL    Bilirubin Total 0.6 <=1.2 mg/dL    Alkaline Phosphatase 77 35 - 104  U/L    AST 25 10 - 35 U/L    ALT 21 10 - 35 U/L    Bilirubin Direct <0.20 0.00 - 0.30 mg/dL   Result Value Ref Range    Lipase 24 13 - 60 U/L   Result Value Ref Range    CRP Inflammation <3.00 <5.00 mg/L   Erythrocyte sedimentation rate auto   Result Value Ref Range    Erythrocyte Sedimentation Rate 10 0 - 20 mm/hr   Result Value Ref Range    Procalcitonin <0.02 <0.05 ng/mL   Result Value Ref Range    Magnesium 1.9 1.7 - 2.3 mg/dL   CBC with platelets and differential   Result Value Ref Range    WBC Count 10.7 4.0 - 11.0 10e3/uL    RBC Count 4.33 3.80 - 5.20 10e6/uL    Hemoglobin 13.7 11.7 - 15.7 g/dL    Hematocrit 41.1 35.0 - 47.0 %    MCV 95 78 - 100 fL    MCH 31.6 26.5 - 33.0 pg    MCHC 33.3 31.5 - 36.5 g/dL    RDW 12.1 10.0 - 15.0 %    Platelet Count 205 150 - 450 10e3/uL    % Neutrophils 73 %    % Lymphocytes 18 %    % Monocytes 8 %    % Eosinophils 1 %    % Basophils 0 %    % Immature Granulocytes 0 %    NRBCs per 100 WBC 0 <1 /100    Absolute Neutrophils 7.7 1.6 - 8.3 10e3/uL    Absolute Lymphocytes 2.0 0.8 - 5.3 10e3/uL    Absolute Monocytes 0.9 0.0 - 1.3 10e3/uL    Absolute Eosinophils 0.1 0.0 - 0.7 10e3/uL    Absolute Basophils 0.0 0.0 - 0.2 10e3/uL    Absolute Immature Granulocytes 0.0 <=0.4 10e3/uL    Absolute NRBCs 0.0 10e3/uL   Extra Blue Top Tube   Result Value Ref Range    Hold Specimen JIC    Extra Red Top Tube   Result Value Ref Range    Hold Specimen JIC    HCG qualitative urine   Result Value Ref Range    hCG Urine Qualitative Negative Negative       RADIOLOGY:  Reviewed all pertinent imaging. Please see official radiology report.  US Lower Extremity Venous Duplex Right   Final Result   IMPRESSION:   1.  No deep venous thrombosis in the right lower extremity.   2.  Mild subcutaneous edema right groin with associated mild reactive right inguinal lymph node enlargement.      CT Abdomen Pelvis w Contrast   Final Result   IMPRESSION:    1.  Mild dermal thickening and subdermal subcutaneous edema  along the right gluteal cleft just inferior to the anal verge, but no abscess or visible perianal fistula.   2.  Ileocecal resection with ileal ascending colostomy.    3.  No bowel obstruction or inflammatory change. Tiny calcified stone in the otherwise normal-appearing gallbladder.          I, Vaibhav Segura, am serving as a scribe to document services personally performed by Sandra Bellamy PA-C based on my observation and the provider's statements to me. I, Sandra Bellamy PA-C, attest that Vaibhav Segura is acting in a scribe capacity, has observed my performance of the services and has documented them in accordance with my direction.    Sandra Bellamy PA-C  [facility] EMERGENCY DEPARTMENT  [address] [place] [id number]  [phone]     Sandra Bellamy PA-C  05/02/23 2018

## 2023-05-02 NOTE — ED TRIAGE NOTES
Pt arrives to triage ambulatory endorsing that she believes there to be a sore on the right internal buttock cheek approx 4 weeks ago, yesterday began to have pain that wraps under right buttock into inner thigh and partially the groin. Notices that vein is swollen also. Discomfort 4/10 aching pain in the upper leg primarily. Reports concern of blood clot.    Hx crohns       0

## 2023-05-07 LAB
BACTERIA BLD CULT: NO GROWTH
BACTERIA BLD CULT: NO GROWTH

## 2023-06-24 ENCOUNTER — HEALTH MAINTENANCE LETTER (OUTPATIENT)
Age: 48
End: 2023-06-24

## 2023-09-02 ENCOUNTER — HEALTH MAINTENANCE LETTER (OUTPATIENT)
Age: 48
End: 2023-09-02

## 2024-09-05 ENCOUNTER — LAB REQUISITION (OUTPATIENT)
Dept: LAB | Facility: CLINIC | Age: 49
End: 2024-09-05

## 2024-09-05 DIAGNOSIS — R61 GENERALIZED HYPERHIDROSIS: ICD-10-CM

## 2024-09-05 DIAGNOSIS — E04.1 NONTOXIC SINGLE THYROID NODULE: ICD-10-CM

## 2024-09-05 DIAGNOSIS — Z13.1 ENCOUNTER FOR SCREENING FOR DIABETES MELLITUS: ICD-10-CM

## 2024-09-05 LAB — HBA1C MFR BLD: 5.4 %

## 2024-09-05 PROCEDURE — 83036 HEMOGLOBIN GLYCOSYLATED A1C: CPT | Performed by: OBSTETRICS & GYNECOLOGY

## 2024-09-05 PROCEDURE — 83001 ASSAY OF GONADOTROPIN (FSH): CPT | Performed by: OBSTETRICS & GYNECOLOGY

## 2024-09-05 PROCEDURE — 84443 ASSAY THYROID STIM HORMONE: CPT | Performed by: OBSTETRICS & GYNECOLOGY

## 2024-09-06 LAB
FSH SERPL IRP2-ACNC: 68.9 MIU/ML
TSH SERPL DL<=0.005 MIU/L-ACNC: 2.55 UIU/ML (ref 0.3–4.2)

## 2024-10-20 ENCOUNTER — HEALTH MAINTENANCE LETTER (OUTPATIENT)
Age: 49
End: 2024-10-20

## 2025-07-06 ENCOUNTER — HEALTH MAINTENANCE LETTER (OUTPATIENT)
Age: 50
End: 2025-07-06